# Patient Record
Sex: FEMALE | Race: WHITE | NOT HISPANIC OR LATINO | Employment: FULL TIME
[De-identification: names, ages, dates, MRNs, and addresses within clinical notes are randomized per-mention and may not be internally consistent; named-entity substitution may affect disease eponyms.]

---

## 2017-02-14 ENCOUNTER — RECORDS - HEALTHEAST (OUTPATIENT)
Dept: ADMINISTRATIVE | Facility: OTHER | Age: 32
End: 2017-02-14

## 2017-05-26 ENCOUNTER — RECORDS - HEALTHEAST (OUTPATIENT)
Dept: ADMINISTRATIVE | Facility: OTHER | Age: 32
End: 2017-05-26

## 2017-08-14 ENCOUNTER — RECORDS - HEALTHEAST (OUTPATIENT)
Dept: ADMINISTRATIVE | Facility: OTHER | Age: 32
End: 2017-08-14

## 2017-08-14 LAB — HBA1C MFR BLD: 5.5 % (ref 4.3–5.6)

## 2018-01-22 ENCOUNTER — RECORDS - HEALTHEAST (OUTPATIENT)
Dept: ADMINISTRATIVE | Facility: OTHER | Age: 33
End: 2018-01-22

## 2018-01-30 ENCOUNTER — RECORDS - HEALTHEAST (OUTPATIENT)
Dept: ADMINISTRATIVE | Facility: OTHER | Age: 33
End: 2018-01-30

## 2018-01-30 LAB — HBA1C MFR BLD: 6.1 % (ref 4.3–5.6)

## 2018-04-03 ENCOUNTER — RECORDS - HEALTHEAST (OUTPATIENT)
Dept: ADMINISTRATIVE | Facility: OTHER | Age: 33
End: 2018-04-03

## 2019-09-03 ENCOUNTER — RECORDS - HEALTHEAST (OUTPATIENT)
Dept: ADMINISTRATIVE | Facility: OTHER | Age: 34
End: 2019-09-03

## 2019-09-03 LAB
CHOLEST SERPL-MCNC: 162 MG/DL (ref 0–199)
HBA1C MFR BLD: 5.8 % (ref 0–5.6)
HDLC SERPL-MCNC: 79 MG/DL
LDLC SERPL CALC-MCNC: 72 MG/DL
NON HDL CHOL. (LDL+VLDL): 83
TRIGLYCERIDES (HISTORICAL CONVERSION): 56 MG/DL

## 2019-10-14 ENCOUNTER — RECORDS - HEALTHEAST (OUTPATIENT)
Dept: ADMINISTRATIVE | Facility: OTHER | Age: 34
End: 2019-10-14

## 2019-11-25 ENCOUNTER — RECORDS - HEALTHEAST (OUTPATIENT)
Dept: ADMINISTRATIVE | Facility: OTHER | Age: 34
End: 2019-11-25

## 2020-01-06 ENCOUNTER — COMMUNICATION - HEALTHEAST (OUTPATIENT)
Dept: HEALTH INFORMATION MANAGEMENT | Facility: CLINIC | Age: 35
End: 2020-01-06

## 2020-01-06 ENCOUNTER — OFFICE VISIT - HEALTHEAST (OUTPATIENT)
Dept: FAMILY MEDICINE | Facility: CLINIC | Age: 35
End: 2020-01-06

## 2020-01-06 DIAGNOSIS — I10 ESSENTIAL HYPERTENSION: ICD-10-CM

## 2020-01-06 DIAGNOSIS — L71.9 ROSACEA: ICD-10-CM

## 2020-01-06 DIAGNOSIS — Z76.89 ESTABLISHING CARE WITH NEW DOCTOR, ENCOUNTER FOR: ICD-10-CM

## 2020-01-06 DIAGNOSIS — Z98.84 H/O GASTRIC BYPASS: ICD-10-CM

## 2020-01-06 DIAGNOSIS — F41.9 ANXIETY: ICD-10-CM

## 2020-01-06 DIAGNOSIS — E56.9 VITAMIN DEFICIENCY: ICD-10-CM

## 2020-01-06 DIAGNOSIS — F33.0 MILD EPISODE OF RECURRENT MAJOR DEPRESSIVE DISORDER (H): ICD-10-CM

## 2020-01-06 DIAGNOSIS — G47.00 INSOMNIA, UNSPECIFIED TYPE: ICD-10-CM

## 2020-01-06 LAB
ANION GAP SERPL CALCULATED.3IONS-SCNC: 9 MMOL/L (ref 5–18)
BUN SERPL-MCNC: 8 MG/DL (ref 8–22)
CALCIUM SERPL-MCNC: 9.5 MG/DL (ref 8.5–10.5)
CHLORIDE BLD-SCNC: 109 MMOL/L (ref 98–107)
CO2 SERPL-SCNC: 22 MMOL/L (ref 22–31)
CREAT SERPL-MCNC: 0.79 MG/DL (ref 0.6–1.1)
GFR SERPL CREATININE-BSD FRML MDRD: >60 ML/MIN/1.73M2
GLUCOSE BLD-MCNC: 98 MG/DL (ref 70–125)
POTASSIUM BLD-SCNC: 4.1 MMOL/L (ref 3.5–5)
SODIUM SERPL-SCNC: 140 MMOL/L (ref 136–145)

## 2020-01-06 RX ORDER — COPPER 313.4 MG/1
1 INTRAUTERINE DEVICE INTRAUTERINE
Status: SHIPPED | COMMUNITY
Start: 2017-08-04 | End: 2021-08-06

## 2020-01-06 ASSESSMENT — ANXIETY QUESTIONNAIRES
2. NOT BEING ABLE TO STOP OR CONTROL WORRYING: SEVERAL DAYS
6. BECOMING EASILY ANNOYED OR IRRITABLE: SEVERAL DAYS
7. FEELING AFRAID AS IF SOMETHING AWFUL MIGHT HAPPEN: MORE THAN HALF THE DAYS
GAD7 TOTAL SCORE: 8
IF YOU CHECKED OFF ANY PROBLEMS ON THIS QUESTIONNAIRE, HOW DIFFICULT HAVE THESE PROBLEMS MADE IT FOR YOU TO DO YOUR WORK, TAKE CARE OF THINGS AT HOME, OR GET ALONG WITH OTHER PEOPLE: SOMEWHAT DIFFICULT
1. FEELING NERVOUS, ANXIOUS, OR ON EDGE: SEVERAL DAYS
4. TROUBLE RELAXING: MORE THAN HALF THE DAYS
3. WORRYING TOO MUCH ABOUT DIFFERENT THINGS: SEVERAL DAYS
5. BEING SO RESTLESS THAT IT IS HARD TO SIT STILL: NOT AT ALL

## 2020-01-06 ASSESSMENT — MIFFLIN-ST. JEOR: SCORE: 1462.07

## 2020-01-06 ASSESSMENT — PATIENT HEALTH QUESTIONNAIRE - PHQ9: SUM OF ALL RESPONSES TO PHQ QUESTIONS 1-9: 4

## 2020-01-07 LAB
25(OH)D3 SERPL-MCNC: 18 NG/ML (ref 30–80)
25(OH)D3 SERPL-MCNC: 18 NG/ML (ref 30–80)

## 2020-01-24 ENCOUNTER — RECORDS - HEALTHEAST (OUTPATIENT)
Dept: HEALTH INFORMATION MANAGEMENT | Facility: CLINIC | Age: 35
End: 2020-01-24

## 2020-02-13 ENCOUNTER — COMMUNICATION - HEALTHEAST (OUTPATIENT)
Dept: FAMILY MEDICINE | Facility: CLINIC | Age: 35
End: 2020-02-13

## 2020-02-13 DIAGNOSIS — L71.9 ROSACEA: ICD-10-CM

## 2020-02-14 ENCOUNTER — COMMUNICATION - HEALTHEAST (OUTPATIENT)
Dept: FAMILY MEDICINE | Facility: CLINIC | Age: 35
End: 2020-02-14

## 2020-02-14 DIAGNOSIS — L71.9 ROSACEA: ICD-10-CM

## 2020-02-17 RX ORDER — METRONIDAZOLE 7.5 MG/G
GEL TOPICAL 2 TIMES DAILY
Qty: 45 G | Refills: 0 | Status: SHIPPED | OUTPATIENT
Start: 2020-02-17 | End: 2021-08-06

## 2020-04-23 ENCOUNTER — COMMUNICATION - HEALTHEAST (OUTPATIENT)
Dept: FAMILY MEDICINE | Facility: CLINIC | Age: 35
End: 2020-04-23

## 2020-04-30 ENCOUNTER — OFFICE VISIT - HEALTHEAST (OUTPATIENT)
Dept: FAMILY MEDICINE | Facility: CLINIC | Age: 35
End: 2020-04-30

## 2020-04-30 DIAGNOSIS — F41.9 ANXIETY: ICD-10-CM

## 2020-04-30 DIAGNOSIS — F33.0 MILD EPISODE OF RECURRENT MAJOR DEPRESSIVE DISORDER (H): ICD-10-CM

## 2020-04-30 DIAGNOSIS — G47.00 INSOMNIA, UNSPECIFIED TYPE: ICD-10-CM

## 2020-04-30 DIAGNOSIS — I10 ESSENTIAL HYPERTENSION: ICD-10-CM

## 2020-04-30 RX ORDER — PHENOL 1.4 %
AEROSOL, SPRAY (ML) MUCOUS MEMBRANE
Status: SHIPPED | COMMUNITY
Start: 2020-04-30 | End: 2021-08-06

## 2020-04-30 ASSESSMENT — PATIENT HEALTH QUESTIONNAIRE - PHQ9: SUM OF ALL RESPONSES TO PHQ QUESTIONS 1-9: 5

## 2020-04-30 ASSESSMENT — ANXIETY QUESTIONNAIRES
6. BECOMING EASILY ANNOYED OR IRRITABLE: SEVERAL DAYS
4. TROUBLE RELAXING: MORE THAN HALF THE DAYS
7. FEELING AFRAID AS IF SOMETHING AWFUL MIGHT HAPPEN: NOT AT ALL
2. NOT BEING ABLE TO STOP OR CONTROL WORRYING: MORE THAN HALF THE DAYS
3. WORRYING TOO MUCH ABOUT DIFFERENT THINGS: SEVERAL DAYS
GAD7 TOTAL SCORE: 9
1. FEELING NERVOUS, ANXIOUS, OR ON EDGE: NEARLY EVERY DAY
5. BEING SO RESTLESS THAT IT IS HARD TO SIT STILL: NOT AT ALL

## 2020-05-14 ENCOUNTER — OFFICE VISIT - HEALTHEAST (OUTPATIENT)
Dept: FAMILY MEDICINE | Facility: CLINIC | Age: 35
End: 2020-05-14

## 2020-05-14 DIAGNOSIS — G47.00 INSOMNIA, UNSPECIFIED TYPE: ICD-10-CM

## 2020-05-14 DIAGNOSIS — F41.9 ANXIETY: ICD-10-CM

## 2020-05-14 DIAGNOSIS — F33.0 MILD EPISODE OF RECURRENT MAJOR DEPRESSIVE DISORDER (H): ICD-10-CM

## 2020-05-14 RX ORDER — MIRTAZAPINE 15 MG/1
15 TABLET, FILM COATED ORAL AT BEDTIME
Qty: 15 TABLET | Refills: 0 | Status: SHIPPED | OUTPATIENT
Start: 2020-05-14 | End: 2021-08-06

## 2020-05-14 ASSESSMENT — ANXIETY QUESTIONNAIRES
2. NOT BEING ABLE TO STOP OR CONTROL WORRYING: NOT AT ALL
4. TROUBLE RELAXING: MORE THAN HALF THE DAYS
1. FEELING NERVOUS, ANXIOUS, OR ON EDGE: SEVERAL DAYS
3. WORRYING TOO MUCH ABOUT DIFFERENT THINGS: SEVERAL DAYS
5. BEING SO RESTLESS THAT IT IS HARD TO SIT STILL: NOT AT ALL
GAD7 TOTAL SCORE: 5
7. FEELING AFRAID AS IF SOMETHING AWFUL MIGHT HAPPEN: NOT AT ALL
6. BECOMING EASILY ANNOYED OR IRRITABLE: SEVERAL DAYS

## 2020-05-14 ASSESSMENT — PATIENT HEALTH QUESTIONNAIRE - PHQ9: SUM OF ALL RESPONSES TO PHQ QUESTIONS 1-9: 5

## 2020-07-30 ENCOUNTER — VIRTUAL VISIT (OUTPATIENT)
Dept: FAMILY MEDICINE | Facility: OTHER | Age: 35
End: 2020-07-30
Payer: COMMERCIAL

## 2020-07-30 PROCEDURE — 99421 OL DIG E/M SVC 5-10 MIN: CPT | Performed by: PHYSICIAN ASSISTANT

## 2020-07-31 ENCOUNTER — AMBULATORY - HEALTHEAST (OUTPATIENT)
Dept: FAMILY MEDICINE | Facility: CLINIC | Age: 35
End: 2020-07-31

## 2020-07-31 DIAGNOSIS — Z20.822 SUSPECTED COVID-19 VIRUS INFECTION: ICD-10-CM

## 2020-07-31 NOTE — PROGRESS NOTES
"Date: 2020 09:51:04  Clinician: Augusto Freeman  Clinician NPI: [id number]  Patient: Edel Winchester  Patient : 1985  Patient Address: [address] [place] [address]  Patient Phone: [phone]  Visit Protocol: URI  Patient Summary:  Edel is a 35 year old ( : 1985 ) female who initiated a Visit for COVID-19 (Coronavirus) evaluation and screening. When asked the question \"Please sign me up to receive news, health information and promotions from [employer].\", Edel responded \"No\".    Edel states her symptoms started 1-2 days ago.   Her symptoms consist of ageusia, myalgia, a sore throat, anosmia, facial pain or pressure, a cough, nasal congestion, rhinitis, malaise, and a headache. Edel also feels feverish.   Symptom details     Nasal secretions: The color of her mucus is yellow.    Cough: Edel coughs a few times an hour and her cough is not more bothersome at night. Phlegm does not come into her throat when she coughs. She does not believe her cough is caused by post-nasal drip.     Sore throat: Edel reports having moderate throat pain (4-6 on a 10 point pain scale), does not have exudate on her tonsils, and can swallow liquids. She is not sure if the lymph nodes in her neck are enlarged. A rash has not appeared on the skin since the sore throat started.     Temperature: Her current temperature is 98.7 degrees Fahrenheit.     Facial pain or pressure: The facial pain or pressure feels worse when bending over or leaning forward.     Headache: She states the headache is moderate (4-6 on a 10 point pain scale).      Edel denies having wheezing, nausea, teeth pain, diarrhea, vomiting, ear pain, and chills. She also denies having recent facial or sinus surgery in the past 60 days, taking antibiotic medication in the past month, and having a sinus infection within the past year. She is not experiencing dyspnea.   Precipitating events  Within the past week, Edel has not been " exposed to someone with strep throat. She has not recently been exposed to someone with influenza. Edel has been in close contact with the following high risk individuals: people with asthma, heart disease or diabetes and children under the age of 5.   Pertinent COVID-19 (Coronavirus) information  In the past 14 days, Edel has not worked in a congregate living setting.   She does not work or volunteer as healthcare worker or a  and does not work or volunteer in a healthcare facility.   Edel also has not lived in a congregate living setting in the past 14 days. She does not live with a healthcare worker.   Edel has not had a close contact with a laboratory-confirmed COVID-19 patient within 14 days of symptom onset.   Pertinent medical history  Edel does not get yeast infections when she takes antibiotics.   Edel needs a return to work/school note.   Weight: 170 lbs   Edel does not smoke or use smokeless tobacco.   She denies pregnancy and denies breastfeeding. She has menstruated in the past month.   Weight: 170 lbs    MEDICATIONS: hydrochlorothiazide oral, sertraline oral, ALLERGIES: NKDA  Clinician Response:  Dear Edel,   Your symptoms show that you may have coronavirus (COVID-19). This illness can cause fever, cough and trouble breathing. Many people get a mild case and get better on their own. Some people can get very sick.  What should I do?  We would like to test you for this virus.   1. Please call [phone] to schedule your visit. Explain that you were referred by [facility] to have a COVID-19 test. Be ready to [facility].  The following will serve as your written order for this COVID Test, ordered by me, for the indication of suspected COVID [Z20.828]: The test will be ordered in [facility], our electronic health record, after you are scheduled. It will show as ordered and authorized by John Garcia MD.  Order: COVID-19 (Coronavirus) PCR for SYMPTOMATIC testing from  "[facility].      2. When it's time for your COVID test:  Stay at least 6 feet away from others. (If someone will drive you to your test, stay in the backseat, as far away from the  as you can.)   Cover your mouth and nose with a mask, tissue or washcloth.  Go straight to the testing site. Don't make any stops on the way there or back.      3.Starting now: Stay home and away from others (self-isolate) until:   You've had no fever---and no medicine that reduces fever---for 3 full days (72 hours). And...   Your other symptoms have gotten better. For example, your cough or breathing has improved. And...   At least 10 days have passed since your symptoms started.       During this time, don't leave the house except for testing or medical care.   Stay in your own room, even for meals. Use your own bathroom if you can.   Stay away from others in your home. No hugging, kissing or shaking hands. No visitors.  Don't go to work, school or anywhere else.    Clean \"high touch\" surfaces often (doorknobs, counters, handles, etc.). Use a household cleaning spray or wipes. You'll find a full list of  on the EPA website: www.epa.gov/pesticide-registration/list-n-disinfectants-use-against-sars-cov-2.   Cover your mouth and nose with a mask, tissue or washcloth to avoid spreading germs.  Wash your hands and face often. Use soap and water.  Caregivers in these groups are at risk for severe illness due to COVID-19:  o People 65 years and older  o People who live in a nursing home or long-term care facility  o People with chronic disease (lung, heart, cancer, diabetes, kidney, liver, immunologic)  o People who have a weakened immune system, including those who:   Are in cancer treatment  Take medicine that weakens the immune system, such as corticosteroids  Had a bone marrow or organ transplant  Have an immune deficiency  Have poorly controlled HIV or AIDS  Are obese (body mass index of 40 or higher)  Smoke regularly   o " Caregivers should wear gloves while washing dishes, handling laundry and cleaning bedrooms and bathrooms.  o Use caution when washing and drying laundry: Don't shake dirty laundry, and use the warmest water setting that you can.  o For more tips, go to www.cdc.gov/coronavirus/2019-ncov/downloads/10Things.pdf.    4.Sign up for [facility]. We know it's scary to hear that you might have COVID-19. We want to track your symptoms to make sure you're okay over the next 2 weeks. Please look for an email from [employer]---this is a free, online program that we'll use to keep in touch. To sign up, follow the link in the email. Learn more at [email or web address]  How can I take care of myself?   Get lots of rest. Drink extra fluids (unless a doctor has told you not to).   Take Tylenol (acetaminophen) for fever or pain. If you have liver or kidney problems, ask your family doctor if it's okay to take Tylenol.   Adults can take either:    650 mg (two 325 mg pills) every 4 to 6 hours, or...   1,000 mg (two 500 mg pills) every 8 hours as needed.    Note: Don't take more than 3,000 mg in one day. Acetaminophen is found in many medicines (both prescribed and over-the-counter medicines). Read all labels to be sure you don't take too much.   For children, check the Tylenol bottle for the right dose. The dose is based on the child's age or weight.    If you have other health problems (like cancer, heart failure, an organ transplant or severe kidney disease): Call your specialty clinic if you don't feel better in the next 2 days.       Know when to call 911. Emergency warning signs include:    Trouble breathing or shortness of breath Pain or pressure in the chest that doesn't go away Feeling confused like you haven't felt before, or not being able to wake up Bluish-colored lips or face.  Where can I get more information?    [facility] -- About COVID-19: [email or web address]   CDC -- What to Do If  You're Sick: www.cdc.gov/coronavirus/2019-ncov/about/steps-when-sick.html   CDC -- Ending Home Isolation: www.cdc.gov/coronavirus/2019-ncov/hcp/disposition-in-home-patients.html   [facility] -- Caring for Someone: www.cdc.gov/coronavirus/2019-ncov/if-you-are-sick/care-for-someone.html   [facility] -- Interim Guidance for Hospital Discharge to Home: www.[facility].[place]./diseases/coronavirus/hcp/hospdischarge.pdf   [facility] clinical trials (COVID-19 research studies): clinicalaffairs.[facility]-clinical-trials    Below are the COVID-19 hotlines at the [place] Department of Health ([facility]). Interpreters are available.    For health questions: Call 152-112-2400 or 1-938.422.7124 (7 a.m. to 7 p.m.) For questions about schools and childcare: Call 501-213-6086 or 1-256.728.6597 (7 a.m. to 7 p.m.)    Diagnosis: Acute upper respiratory infection, unspecified  Diagnosis ICD: J06.9  Additional Clinician Notes: if your symptoms are not improving or worsen, please go to one of our urgent care locations for evaluation.

## 2020-08-02 ENCOUNTER — COMMUNICATION - HEALTHEAST (OUTPATIENT)
Dept: SCHEDULING | Facility: CLINIC | Age: 35
End: 2020-08-02

## 2020-08-12 ENCOUNTER — COMMUNICATION - HEALTHEAST (OUTPATIENT)
Dept: FAMILY MEDICINE | Facility: CLINIC | Age: 35
End: 2020-08-12

## 2020-08-12 DIAGNOSIS — I10 ESSENTIAL HYPERTENSION: ICD-10-CM

## 2020-08-12 DIAGNOSIS — F41.9 ANXIETY: ICD-10-CM

## 2020-08-12 DIAGNOSIS — F33.0 MILD EPISODE OF RECURRENT MAJOR DEPRESSIVE DISORDER (H): ICD-10-CM

## 2020-08-13 RX ORDER — HYDROCHLOROTHIAZIDE 12.5 MG/1
12.5 CAPSULE ORAL DAILY
Qty: 90 CAPSULE | Refills: 1 | Status: SHIPPED | OUTPATIENT
Start: 2020-08-13 | End: 2021-08-06

## 2020-08-20 ENCOUNTER — COMMUNICATION - HEALTHEAST (OUTPATIENT)
Dept: FAMILY MEDICINE | Facility: CLINIC | Age: 35
End: 2020-08-20

## 2020-12-14 ENCOUNTER — COMMUNICATION - HEALTHEAST (OUTPATIENT)
Dept: FAMILY MEDICINE | Facility: CLINIC | Age: 35
End: 2020-12-14

## 2021-01-07 ENCOUNTER — TRANSFERRED RECORDS (OUTPATIENT)
Dept: HEALTH INFORMATION MANAGEMENT | Facility: CLINIC | Age: 36
End: 2021-01-07

## 2021-01-07 LAB
ALT SERPL-CCNC: 13 U/L (ref 6–26)
AST SERPL-CCNC: 11 U/L (ref 10–30)
C TRACH DNA SPEC QL PROBE+SIG AMP: NEGATIVE
CREATININE (EXTERNAL): 0.72 MG/DL (ref 0.5–1.1)
GFR ESTIMATED (EXTERNAL): 109 ML/MIN/1.73M2
GFR ESTIMATED (IF AFRICAN AMERICAN) (EXTERNAL): 127 ML/MIN/1.73M2
HBA1C MFR BLD: 5.6 %
HIV 1&2 EXT: NORMAL
N GONORRHOEA DNA SPEC QL PROBE+SIG AMP: NEGATIVE
SPECIMEN DESCRIP: NORMAL
SPECIMEN DESCRIPTION: NORMAL
TSH SERPL-ACNC: 0.92 MIU/L (ref 0.4–4.5)

## 2021-02-03 ENCOUNTER — TRANSFERRED RECORDS (OUTPATIENT)
Dept: HEALTH INFORMATION MANAGEMENT | Facility: CLINIC | Age: 36
End: 2021-02-03

## 2021-04-20 ENCOUNTER — COMMUNICATION - HEALTHEAST (OUTPATIENT)
Dept: FAMILY MEDICINE | Facility: CLINIC | Age: 36
End: 2021-04-20

## 2021-04-20 DIAGNOSIS — F33.0 MILD EPISODE OF RECURRENT MAJOR DEPRESSIVE DISORDER (H): ICD-10-CM

## 2021-04-20 DIAGNOSIS — F41.9 ANXIETY: ICD-10-CM

## 2021-04-21 RX ORDER — SERTRALINE HYDROCHLORIDE 100 MG/1
TABLET, FILM COATED ORAL
Qty: 90 TABLET | Refills: 0 | Status: SHIPPED | OUTPATIENT
Start: 2021-04-21 | End: 2021-08-02

## 2021-05-26 ASSESSMENT — PATIENT HEALTH QUESTIONNAIRE - PHQ9: SUM OF ALL RESPONSES TO PHQ QUESTIONS 1-9: 4

## 2021-05-27 ENCOUNTER — TRANSFERRED RECORDS (OUTPATIENT)
Dept: HEALTH INFORMATION MANAGEMENT | Facility: CLINIC | Age: 36
End: 2021-05-27

## 2021-05-27 ASSESSMENT — PATIENT HEALTH QUESTIONNAIRE - PHQ9
SUM OF ALL RESPONSES TO PHQ QUESTIONS 1-9: 5
SUM OF ALL RESPONSES TO PHQ QUESTIONS 1-9: 5

## 2021-05-28 ASSESSMENT — ANXIETY QUESTIONNAIRES
GAD7 TOTAL SCORE: 9
GAD7 TOTAL SCORE: 5
GAD7 TOTAL SCORE: 8

## 2021-05-30 ENCOUNTER — RECORDS - HEALTHEAST (OUTPATIENT)
Dept: ADMINISTRATIVE | Facility: CLINIC | Age: 36
End: 2021-05-30

## 2021-06-01 ENCOUNTER — RECORDS - HEALTHEAST (OUTPATIENT)
Dept: ADMINISTRATIVE | Facility: CLINIC | Age: 36
End: 2021-06-01

## 2021-06-04 VITALS
OXYGEN SATURATION: 100 % | RESPIRATION RATE: 16 BRPM | BODY MASS INDEX: 32.81 KG/M2 | HEART RATE: 76 BPM | DIASTOLIC BLOOD PRESSURE: 62 MMHG | HEIGHT: 62 IN | TEMPERATURE: 98.2 F | SYSTOLIC BLOOD PRESSURE: 112 MMHG | WEIGHT: 178.31 LBS

## 2021-06-04 VITALS — BODY MASS INDEX: 30.18 KG/M2 | WEIGHT: 165 LBS

## 2021-06-04 NOTE — PROGRESS NOTES
Office Visit - New Patient   Edel Castro   34 y.o.  female    Date of visit: 1/6/2020  Physician: Velvet Everett CNP     Assessment and Plan   1. Anxiety  sertraline (ZOLOFT) 100 MG tablet   2. Mild episode of recurrent major depressive disorder (H)  sertraline (ZOLOFT) 100 MG tablet   3. Insomnia, unspecified type     4. Essential hypertension  hydroCHLOROthiazide (MICROZIDE) 12.5 mg capsule    Basic Metabolic Panel   5. H/O gastric bypass  Vitamin D, Total (25-Hydroxy)   6. Vitamin deficiency  Vitamin D, Total (25-Hydroxy)   7. Rosacea     8. Establishing care with new doctor, encounter for       PHQ-2 Total Score: 0 (1/6/2020  9:00 AM)  PHQ-9 Total Score: 4 (1/6/2020  9:00 AM)    MECHELLE-7: 8    She will continue on the same dose of Zoloft for now. She should continue on the same dose of melatonin for insomnia.  She will continue to see her therapist every 2 weeks and continue to attend couples counseling monthly with her  at [facility].    She will continue to work on diet and exercise/ weight loss.  Her goal is to eventually get off of her blood pressure medication.  I do not see this being an issue since she is on a low-dose at this time.  She is due for some blood work today to follow-up on her electrolyte status since starting the diuretic therapy 2 months ago.    Continue with topical MetroGel for rosacea    The following high BMI interventions were performed this visit: encouragement to exercise and weight monitoring    Return in about 6 months (around 7/6/2020) for depression, anxiety, annual physical, hypertension.     Chief Complaint   Chief Complaint   Patient presents with     [facility]     [facility] - Dr. Jackman        Patient Profile   Social History     Social History Narrative     Not on file        Past Medical History   Patient Active Problem List   Diagnosis     IUD (intrauterine device) in place     Acne     Anxiety     Cervical cancer  screening     Polycystic ovary syndrome     Severe pre-eclampsia in third trimester     Status post bariatric surgery     Surveillance for birth control, intrauterine device     Mild episode of recurrent major depressive disorder (H)     Establishing care with new doctor, encounter for     Essential hypertension     H/O gastric bypass     Vitamin deficiency     Rosacea       Past Surgical History  She has a past surgical history that includes Gastric bypass (2014).     History of Present Illness   This 34 y.o. old presents to establish care.  Medical, family and surgical history reviewed.  Patient is , she has 1 child age 2-1/2.  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  She does not drink alcohol, she is a non-smoker and she denies any illicit drug use.  She is trying to follow a low-carb diet, she is status post gastric bypass sleeve and 2014.  Her maximum weight was 240 preop and her lowest weight was 150, today she weighs 178 with a current BMI of 32.6.  Does drink over 64 ounces of water daily, she does exercise 3 days/week for 30 to 45 minutes at a time.  She does have a copper IUD in place which was inserted in 2017.  She does get her period monthly and it typically last 7 days with moderate flow.  She has no history of abortions or miscarriages, she did have one Pap smear back in 2011 that was positive for ASCUS, since that time, her Pap smears have been normal.  Her last Pap was performed in 2016.  Last physical was in November 2019.  She is up-to-date with vaccinations.    Medical history includes rosacea, hypertension, vitamin D deficiency, anxiety, depression and insomnia.    Rosacea is currently well controlled using topical MetroGel which was initiated by her previous PCP in November 2019.      Blood pressure is well controlled today on current dose of hydrochlorothiazide 12.5 mg daily.  She denies any chest pain, shortness of breath, dizziness, lower extremity  swelling or headaches since starting the medication 2 months ago.  No history of coronary artery disease.    Anxiety and depression are well controlled on her current dose of sertraline.  She has been on the medication for 6 months now.  She feels that her anxiety and depression fully peaked about that time.  She was seen a therapist at that time who recommended that she start some sort of medication due to the severity of her symptoms.  She feels the medication has been working well in controlling her symptoms.  Her main stressors revolve around her family life which is a blended family and dealing with her 's ex and her work setting.  When she was a patient at [facility], her 's ex accessed the patient's daughter's chart which is a violation of HIPAA.  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  She is hopeful that her is been's ex-wife will not be able to access any of their medical records now that she has changed facilities.  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  Her coping strategies include going to counseling, rescuing dogs and fostering dogs, taking trips to [place] and exercising.  He has no thoughts self-harm or plans for suicide, no history of suicide attempts.    Insomnia: Currently well controlled using melatonin 10 mg 1 hour before sleep.  She is able to sleep roughly 7 hours.  She started taking this medication because she was having difficulty falling asleep.       Review of Systems: A comprehensive review of systems was negative except as noted.     Medications and Allergies   Current Outpatient Medications   Medication Sig Dispense Refill     copper (PARAGARD T 380A) 380 square mm IUD IUD 1 Device by Intrauterine route.       hydroCHLOROthiazide (MICROZIDE) 12.5 mg capsule Take 1 capsule (12.5 mg total) by mouth daily. 90 capsule 1     metroNIDAZOLE (METROGEL) 0.75 % gel Apply  "topically 2 (two) times a day.       sertraline (ZOLOFT) 100 MG tablet Take 1 tablet (100 mg total) by mouth daily. 90 tablet 1     No current facility-administered medications for this visit.      No Known Allergies     Family and Social History   Family History   Problem Relation Age of Onset     Diabetes Mother         pre-diabetic     No Medical Problems Father      No Medical Problems Brother         Social History     Tobacco Use     Smoking status: Former Smoker     Smokeless tobacco: Never Used   Substance Use Topics     Alcohol use: Not Currently     Drug use: Never        Physical Exam   General Appearance:   A & O x4, NAD, pleasant, well groomed  /62   Pulse 76   Temp 98.2  F (36.8  C)   Resp 16   Ht 5' 2\" (1.575 m)   Wt 178 lb 5 oz (80.9 kg)   LMP 01/02/2020 (Exact Date)   SpO2 100%   Breastfeeding No   BMI 32.61 kg/m        RESPIRATORY: Breathing pattern was normal and the chest moved symmetrically.    Lung sounds were normal and there were no abnormal sounds.  CARDIOVASCULAR: Heart rate and rhythm were normal.  S1 and S2 were normal and there were no extra sounds or murmurs.  There was no peripheral edema.  SKIN/HAIR/NAILS: Skin color was normal.  There were no skin lesions.  Hair and nails were normal.  PSYCHIATRIC:  Mood and affect were normal and the patient had normal recent and remote memory. The patient's judgment and insight were normal.         Additional Information     Review and/or order of clinical lab tests:  Review and/or order of radiology tests:  Review and/or order of medicine tests:  Discussion of test results with performing physician:  Decision to obtain old records and/or obtain history from someone other than the patient:  Review and summarization of old records and/or obtaining history from someone other than the patient and.or discussion of case with another health care provider:  Independent visualization of image, tracing or specimen itself:    Time: total time " spent with the patient was 45 minutes of which >50% was spent in counseling and coordination of care we reviewed her medical, family and surgical history along with her plan of care for hypertension, anxiety, depression and insomnia.  We discussed her plan of care for her rosacea as well.  Med list reviewed along with immunization history.     Velvet Everett, CNP  Family Nurse Practitioner  [facility]  [phone]

## 2021-06-06 NOTE — TELEPHONE ENCOUNTER
RN cannot approve Refill Request    RN can NOT refill this medication med is not covered by policy/route to provider. Last office visit: Visit date not found Last Physical: Visit date not found Last MTM visit: Visit date not found Last visit same specialty: 1/6/2020 Velvet Everett NP.  Next visit within 3 mo: Visit date not found  Next physical within 3 mo: Visit date not found      Susan Espitia, [facility] Triage/Med Refill 2/15/2020    Requested Prescriptions   Pending Prescriptions Disp Refills     metroNIDAZOLE (METROGEL) 0.75 % gel 45 g 0     Sig: Apply topically 2 (two) times a day.       Topical Dermatology Medications Refill Protocol Passed - 2/14/2020 10:24 PM        Passed - Patient has had office visit/physical in last 1 year     Last office visit with prescriber/PCP: Visit date not found OR same dept: 1/6/2020 Velvet Everett NP OR same specialty: 1/6/2020 Velvet Everett NP  Last physical: Visit date not found Last MTM visit: Visit date not found   Next visit within 3 mo: Visit date not found  Next physical within 3 mo: Visit date not found  Prescriber OR PCP: Alex Dalton MD  Last diagnosis associated with med order: 1. Rosacea  - metroNIDAZOLE (METROGEL) 0.75 % gel; Apply topically 2 (two) times a day.  Dispense: 45 g; Refill: 0    If protocol passes may refill for 12 months if within 3 months of last provider visit (or a total of 15 months).

## 2021-06-07 NOTE — PROGRESS NOTES
"Edel Castro is a 34 y.o. female who is being evaluated via a billable telephone visit.      The patient has been notified of following:     \"This telephone visit will be conducted via a call between you and your physician/provider. We have found that certain health care needs can be provided without the need for a physical exam.  This service lets us provide the care you need with a short phone conversation.  If a prescription is necessary we can send it directly to your pharmacy.  If lab work is needed we can place an order for that and you can then stop by our lab to have the test done at a later time.    Telephone visits are billed at different rates depending on your insurance coverage. During this emergency period, for some insurers they may be billed the same as an in-person visit.  Please reach out to your insurance provider with any questions.    If during the course of the call the physician/provider feels a telephone visit is not appropriate, you will not be charged for this service.\"    Patient has given verbal consent to a Telephone visit? Yes    Patient would like to receive their AVS by AVS Preference: E-Mail (Inform patient AVS not encrypted with this option).    Additional provider notes: Calling to discuss her insomnia, anxiety, depression and blood pressure.    Anxiety/depression: well controlled on current Zoloft dose of 100 mg daily. She continues to see her therapist every other week. Current stressors: 's ex wife, working from home with kids,  being an essential employee so she worries about COVID exposure because of his job and the health of their children. Coping strategies: seeing her therapist, house projects, walking dog. Denies suicidal thoughts or self harm. She is not having any panic attacks.     Insomnia: having a difficult time falling asleep. She crawls in bed around 10 pm. She does not fall asleep until 1 or 3 am. She is taking Melatonin 10 mg at HS. She tried " Trazodone in the past but she felt too groggy on the medication and she worries about not being able to hear her kids throughout the night if she takes anything too strong. She has tried leaving the TV off and reading but this has not been helpful. She drinks one can of soda daily so caffeine intake is not excessive. She does not exercise. Diet could be better. She discussed her frustrations regarding her insomnia with her therapist who recommended that she speak with me about medication.     Hypertension: she does not check her BP at home. Denies radiation, diaphoresis, shortness of breath, dizziness, syncope, nausea, palpitations, and associated with activity.   She is currently taking HCTZ 12.5 mg daily. No history of CAD. Former smoker.     1. Essential hypertension     2. Anxiety  PHQ9 Depression Screen    hydrOXYzine pamoate (VISTARIL) 25 MG capsule   3. Mild episode of recurrent major depressive disorder (H)  PHQ9 Depression Screen   4. Insomnia, unspecified type  hydrOXYzine pamoate (VISTARIL) 25 MG capsule     PHQ-9: 5  MECHELLE-7: 9    Assessment/Plan:  1. Essential hypertension    -well controlled on current HCTZ dose  -no med changes made  -due for lab work at next visit    2. Anxiety    - PHQ9 Depression Screen  - hydrOXYzine pamoate (VISTARIL) 25 MG capsule; Take 1 capsule (25 mg total) by mouth 3 (three) times a day as needed for anxiety (insomnia).  Dispense: 30 capsule; Refill: 1  -follow up with me in 4 weeks  -continue to see therapist every other week  -watch caffeine intake, discussed importance of regular exercise, healthy diet    3. Mild episode of recurrent major depressive disorder (H)    - PHQ9 Depression Screen  -follow up with me in 4 weeks  -continue to see therapist every other week  -watch caffeine intake, discussed importance of regular exercise, healthy diet        4. Insomnia, unspecified type    - hydrOXYzine pamoate (VISTARIL) 25 MG capsule; Take 1 capsule (25 mg total) by mouth 3  (three) times a day as needed for anxiety (insomnia).  Dispense: 30 capsule; Refill: 1  -follow up with me in 4 weeks  -continue to see therapist every other week  -watch caffeine intake, discussed importance of regular exercise, healthy diet            Phone call duration:  13 minutes    Velvet Everett NP

## 2021-06-08 NOTE — PROGRESS NOTES
"Edel Castro is a 34 y.o. female who is being evaluated via a billable telephone visit.      The patient has been notified of following:     \"This telephone visit will be conducted via a call between you and your physician/provider. We have found that certain health care needs can be provided without the need for a physical exam.  This service lets us provide the care you need with a short phone conversation.  If a prescription is necessary we can send it directly to your pharmacy.  If lab work is needed we can place an order for that and you can then stop by our lab to have the test done at a later time.    Telephone visits are billed at different rates depending on your insurance coverage. During this emergency period, for some insurers they may be billed the same as an in-person visit.  Please reach out to your insurance provider with any questions.    If during the course of the call the physician/provider feels a telephone visit is not appropriate, you will not be charged for this service.\"    Patient has given verbal consent to a Telephone visit? Yes    What phone number would you like to be contacted at? [phone]     Patient would like to receive their AVS by AVS Preference: [place].    Additional provider notes: Calling to discuss her insomnia    Her anxiety and depression are stable on current dose of Zoloft, she has no concerns with this medication. She continues to have difficulty falling asleep at night. On average, it takes her about 3 to 5 hours to fall asleep. Once she is asleep, she is able to sleep through the night. This has been going on for a while now and she has tried Vistaril, Trazodone and Melatonin, all of which were ineffective. She does not exercise and she denies drinking excessive amounts of caffeine. She does watch TV and scrolls on her phone but nothing that she admits would be over the top. She does have smaller children in the home so she worries about being on a medication " "that will \"knock her out\". She still needs to be able to wake up during the night in case her kids need anything.     1. Anxiety  PHQ9 Depression Screen   2. Mild episode of recurrent major depressive disorder (H)  PHQ9 Depression Screen   3. Insomnia, unspecified type  mirtazapine (REMERON) 15 MG tablet     PHQ-9: 5  MECHELLE-7: 5    Assessment/Plan:  1. Anxiety    -stable with current Zoloft dose  - PHQ9 Depression Screen    2. Mild episode of recurrent major depressive disorder (H)    -stable with current Zoloft dose  - PHQ9 Depression Screen    3. Insomnia, unspecified type    - mirtazapine (REMERON) 15 MG tablet; Take 1 tablet (15 mg total) by mouth at bedtime.  Dispense: 15 tablet; Refill: 0  -Melatonin and Vistaril were both ineffective and Trazodone left her feeling groggy  -If she like how the Remeron is working, then she should just request her next refill  -If she finds that the Remeron is ineffective, she will call and schedule another appointment to discuss         Phone call duration:  8 minutes    Velvet Everett NP    "

## 2021-06-10 NOTE — TELEPHONE ENCOUNTER
Refill Approved    Rx renewed per Medication Renewal Policy. Medication was last renewed on 1/6/20.    Melisa Horton, [facility] Connection Triage/Med Refill 8/13/2020     Requested Prescriptions   Pending Prescriptions Disp Refills     sertraline (ZOLOFT) 100 MG tablet 90 tablet 1     Sig: Take 1 tablet (100 mg total) by mouth daily.       SSRI Refill Protocol  Passed - 8/12/2020  5:14 PM        Passed - PCP or prescribing provider visit in last year     Last office visit with prescriber/PCP: 1/6/2020 Velvet Everett NP OR same dept: 1/6/2020 Velvet Everett NP OR same specialty: 1/6/2020 Velvet Everett NP  Last physical: Visit date not found Last MTM visit: Visit date not found   Next visit within 3 mo: Visit date not found  Next physical within 3 mo: Visit date not found  Prescriber OR PCP: Velvet Everett NP  Last diagnosis associated with med order: 1. Anxiety  - sertraline (ZOLOFT) 100 MG tablet; Take 1 tablet (100 mg total) by mouth daily.  Dispense: 90 tablet; Refill: 1    2. Mild episode of recurrent major depressive disorder (H)  - sertraline (ZOLOFT) 100 MG tablet; Take 1 tablet (100 mg total) by mouth daily.  Dispense: 90 tablet; Refill: 1    3. Essential hypertension  - hydroCHLOROthiazide (MICROZIDE) 12.5 mg capsule; Take 1 capsule (12.5 mg total) by mouth daily.  Dispense: 90 capsule; Refill: 1    If protocol passes may refill for 12 months if within 3 months of last provider visit (or a total of 15 months).                hydroCHLOROthiazide (MICROZIDE) 12.5 mg capsule 90 capsule 1     Sig: Take 1 capsule (12.5 mg total) by mouth daily.       Diuretics/Combination Diuretics Refill Protocol  Passed - 8/12/2020  5:14 PM        Passed - Visit with PCP or prescribing provider visit in past 12 months     Last office visit with prescriber/PCP: 1/6/2020 Velvet Everett NP OR same dept: 1/6/2020 Velvet Everett NP OR same specialty: 1/6/2020 Velvet Everett NP  Last physical:  Visit date not found Last MTM visit: Visit date not found   Next visit within 3 mo: Visit date not found  Next physical within 3 mo: Visit date not found  Prescriber OR PCP: Velvet Everett NP  Last diagnosis associated with med order: 1. Anxiety  - sertraline (ZOLOFT) 100 MG tablet; Take 1 tablet (100 mg total) by mouth daily.  Dispense: 90 tablet; Refill: 1    2. Mild episode of recurrent major depressive disorder (H)  - sertraline (ZOLOFT) 100 MG tablet; Take 1 tablet (100 mg total) by mouth daily.  Dispense: 90 tablet; Refill: 1    3. Essential hypertension  - hydroCHLOROthiazide (MICROZIDE) 12.5 mg capsule; Take 1 capsule (12.5 mg total) by mouth daily.  Dispense: 90 capsule; Refill: 1    If protocol passes may refill for 12 months if within 3 months of last provider visit (or a total of 15 months).             Passed - Serum Potassium in past 12 months      Lab Results   Component Value Date    Potassium 4.1 01/06/2020             Passed - Serum Sodium in past 12 months      Lab Results   Component Value Date    Sodium 140 01/06/2020             Passed - Blood pressure on file in past 12 months     BP Readings from Last 1 Encounters:   01/06/20 112/62             Passed - Serum Creatinine in past 12 months      Creatinine   Date Value Ref Range Status   01/06/2020 0.79 0.60 - 1.10 mg/dL Final

## 2021-06-10 NOTE — TELEPHONE ENCOUNTER
Last Med Check: 5/14/20.    Next med check due on: 11/2020.    Future Appointment Scheduled ? No.     Last Med Refill? Both on 1/6/20.    Kasandra East, CMA

## 2021-06-16 PROBLEM — I10 ESSENTIAL HYPERTENSION: Status: ACTIVE | Noted: 2020-01-06

## 2021-06-16 PROBLEM — G47.00 INSOMNIA, UNSPECIFIED TYPE: Status: ACTIVE | Noted: 2020-05-14

## 2021-06-16 PROBLEM — E56.9 VITAMIN DEFICIENCY: Status: ACTIVE | Noted: 2020-01-06

## 2021-06-16 PROBLEM — O14.13 SEVERE PRE-ECLAMPSIA IN THIRD TRIMESTER: Status: ACTIVE | Noted: 2017-05-25

## 2021-06-16 PROBLEM — Z30.431 SURVEILLANCE FOR BIRTH CONTROL, INTRAUTERINE DEVICE: Status: ACTIVE | Noted: 2017-08-04

## 2021-06-16 PROBLEM — L70.9 ACNE: Status: ACTIVE | Noted: 2020-01-06

## 2021-06-16 PROBLEM — Z98.84 H/O GASTRIC BYPASS: Status: ACTIVE | Noted: 2020-01-06

## 2021-06-16 PROBLEM — F41.9 ANXIETY: Status: ACTIVE | Noted: 2020-01-06

## 2021-06-16 PROBLEM — E28.2 POLYCYSTIC OVARY SYNDROME: Status: ACTIVE | Noted: 2020-01-06

## 2021-06-16 PROBLEM — F33.0 MILD EPISODE OF RECURRENT MAJOR DEPRESSIVE DISORDER (H): Status: ACTIVE | Noted: 2020-01-06

## 2021-06-16 PROBLEM — L71.9 ROSACEA: Status: ACTIVE | Noted: 2020-01-06

## 2021-06-16 NOTE — TELEPHONE ENCOUNTER
Refill Approved    Rx renewed per Medication Renewal Policy. Medication was last renewed on 8/13/20.    Jay Ellis, [facility] Connection Triage/Med Refill 4/21/2021     Requested Prescriptions   Pending Prescriptions Disp Refills     sertraline (ZOLOFT) 100 MG tablet [Pharmacy Med Name: SERTRALINE 100MG TABLETS] 90 tablet 1     Sig: TAKE 1 TABLET(100 MG) BY MOUTH DAILY       SSRI Refill Protocol  Passed - 4/20/2021  5:52 PM        Passed - PCP or prescribing provider visit in last year     Last office visit with prescriber/PCP: 1/6/2020 Velvet Everett NP OR same dept: Visit date not found OR same specialty: 1/6/2020 Velvet Everett NP  Last physical: Visit date not found Last MTM visit: Visit date not found   Next visit within 3 mo: Visit date not found  Next physical within 3 mo: Visit date not found  Prescriber OR PCP: Velvet Everett NP  Last diagnosis associated with med order: 1. Anxiety  - sertraline (ZOLOFT) 100 MG tablet [Pharmacy Med Name: SERTRALINE 100MG TABLETS]; TAKE 1 TABLET(100 MG) BY MOUTH DAILY  Dispense: 90 tablet; Refill: 1    2. Mild episode of recurrent major depressive disorder (H)  - sertraline (ZOLOFT) 100 MG tablet [Pharmacy Med Name: SERTRALINE 100MG TABLETS]; TAKE 1 TABLET(100 MG) BY MOUTH DAILY  Dispense: 90 tablet; Refill: 1    If protocol passes may refill for 12 months if within 3 months of last provider visit (or a total of 15 months).

## 2021-06-20 ENCOUNTER — HEALTH MAINTENANCE LETTER (OUTPATIENT)
Age: 36
End: 2021-06-20

## 2021-06-20 NOTE — LETTER
Letter by Luz Marina Becker at      Author: Luz Marina Becker Service: -- Author Type: --    Filed:  Encounter Date: 1/6/2020 Status: Signed         January 6, 2020       Edel Castro  [address] [address]    Dear Edel Castro:    We are pleased to provide you with secure, online access to medical information for you and your family within . Per your request, we have expanded your account to allow access to the records of the following family members:              Daria THAKUR Matthew (privilege ends on 5/31/2029.)     How Do I Log In?  1. In your Internet browser, go to [email or web address]  2. Log into [employer] using your [employer] Username and Password.  3. Click Sign In.        How Do I Access a Family Member's Account?  4. Select the account you want to access by clicking the Hoh with the appropriate patient's name at the top of your screen.   5. You will see a disclaimer page letting you know that you will be viewing a family member's record. Review the disclaimer and then click Accept Proxy Access Disclaimer to proceed.  6. Once you switch to viewing a family member's record, you can navigate to [employer] pages the same way you would for yourself. You can return to your own account by clicking the Hoh at the top of the screen with your name on it.    7. To customize the colors and names of the linked accounts, you can select Personalize from the Profile dropdown menu at the top of the screen, then click the Edit button to make changes.     Additional Information  If you have questions, visit [email or web address], e-mail [email or web address] or call [phone] to talk to our [employer] staff. Remember, [employer] is NOT to be used for urgent needs. For medical emergencies, dial 911.

## 2021-06-20 NOTE — LETTER
Letter by Velvet Everett NP at      Author: Velvet Everett NP Service: -- Author Type: --    Filed:  Encounter Date: 1/6/2020 Status: Signed                    My Depression Action Plan  Name: Edel Castro   Date of Birth 1985  Date: 1/6/2020    My Doctor: Provider, No Primary Care   My Clinic: [facility]  [address]  [place] [address]  [facility] [address]  [phone]          GREEN    ZONE   Good Control    What it looks like:     Things are going generally well. You have normal ups and downs. You may even feel depressed from time to time, but bad moods usually last less than a day.   What you need to do:  1. Continue to care for yourself (see self care plan)  2. Check your depression survival kit and update it as needed  3. Follow your physicians recommendations including any medication.  4. Do not stop taking medication unless you consult with your physician first.           YELLOW         ZONE Getting Worse    What it looks like:     Depression is starting to interfere with your life.     It may be hard to get out of bed; you may be starting to isolate yourself from others.    Symptoms of depression are starting to last most all day and this has happened for several days.     You may have suicidal thoughts but they are not constant.   What you need to do:     1. Call your care team, your response to treatment will improve if you keep your care team informed of your progress. Yellow periods are signs an adjustment may need to be made.     2. Continue your self-care, even if you have to fake it!    3. Talk to someone in your support network    4. Open up your depression survival kit           RED    ZONE Medical Alert - Get Help    What it looks like:     Depression is seriously interfering with your life.     You may experience these or other symptoms: You cant get out of bed most days, cant work or engage in other necessary  activities, you have trouble taking care of basic hygiene, or basic responsibilities, thoughts of suicide or death that will not go away, self-injurious behavior.     What you need to do:  1. Call your care team and request a same-day appointment. If they are not available (weekends or after hours) call your local crisis line, emergency room or 911.            Depression Self Care Plan / Survival Kit    Self-Care for Depression  Heres the deal. Your body and mind are really not as separate as most people think.  What you do and think affects how you feel and how you feel influences what you do and think. This means if you do things that people who feel good do, it will help you feel better.  Sometimes this is all it takes.  There is also a place for medication and therapy depending on how severe your depression is, so be sure to consult with your medical provider and/ or Behavioral Health Consultant if your symptoms are worsening or not improving.     In order to better manage my stress, I will:    Exercise  Get some form of exercise, every day. This will help reduce pain and release endorphins, the feel good chemicals in your brain. This is almost as good as taking antidepressants!  This is not the same as joining a gym and then never going! (they count on that by the way?) It can be as simple as just going for a walk or doing some gardening, anything that will get you moving.      Hygiene   Maintain good hygiene (Get out of bed in the morning, Make your bed, Brush your teeth, Take a shower, and Get dressed like you were going to work, even if you are unemployed).  If your clothes don't fit try to get ones that do.    Diet  I will strive to eat foods that are good for me, drink plenty of water, and avoid excessive sugar, caffeine, alcohol, and other mood-altering substances.  Some foods that are helpful in depression are: complex carbohydrates, B vitamins, flaxseed, fish or fish oil, fresh fruits and  vegetables.    Psychotherapy  I agree to participate in Individual Therapy (if recommended).    Medication  If prescribed medications, I agree to take them.  Missing doses can result in serious side effects.  I understand that drinking alcohol, or other illicit drug use, may cause potential side effects.  I will not stop my medication abruptly without first discussing it with my provider.    Staying Connected With Others  I will stay in touch with my friends, family members, and my primary care provider/team.    Use your imagination  Be creative.  We all have a creative side; it doesnt matter if its oil painting, sand castles, or mud pies! This will also kick up the endorphins.    Witness Beauty  (AKA stop and smell the roses) Take a look outside, even in mid-winter. Notice colors, textures. Watch the squirrels and birds.     Service to others  Be of service to others.  There is always someone else in need.  By helping others we can get out of ourselves and remember the really important things.  This also provides opportunities for practicing all the other parts of the program.    Humor  Laugh and be silly!  Adjust your TV habits for less news and crime-drama and more comedy.    Control your stress  Try breathing deep, massage therapy, biofeedback, and meditation. Find time to relax each day.     My support system    Clinic Contact:  Phone number:    Contact 1:  Phone number:    Contact 2:  Phone number:    Jewish/:  Phone number:    Therapist:  Phone number:    Local crisis center:    Phone number:    Other community support:  Phone number:

## 2021-07-06 ENCOUNTER — HOSPITAL ENCOUNTER (OUTPATIENT)
Facility: CLINIC | Age: 36
End: 2021-07-06
Payer: COMMERCIAL

## 2021-07-21 ENCOUNTER — TRANSFERRED RECORDS (OUTPATIENT)
Dept: HEALTH INFORMATION MANAGEMENT | Facility: CLINIC | Age: 36
End: 2021-07-21

## 2021-08-05 DIAGNOSIS — Z33.1 PREGNANT STATE, INCIDENTAL: Primary | ICD-10-CM

## 2021-08-06 ENCOUNTER — VIRTUAL VISIT (OUTPATIENT)
Dept: FAMILY MEDICINE | Facility: CLINIC | Age: 36
End: 2021-08-06
Payer: COMMERCIAL

## 2021-08-06 DIAGNOSIS — F41.9 ANXIETY: Primary | ICD-10-CM

## 2021-08-06 DIAGNOSIS — F33.0 MILD EPISODE OF RECURRENT MAJOR DEPRESSIVE DISORDER (H): ICD-10-CM

## 2021-08-06 DIAGNOSIS — G47.00 INSOMNIA, UNSPECIFIED TYPE: ICD-10-CM

## 2021-08-06 PROCEDURE — 99213 OFFICE O/P EST LOW 20 MIN: CPT | Mod: 95 | Performed by: NURSE PRACTITIONER

## 2021-08-06 PROCEDURE — 96127 BRIEF EMOTIONAL/BEHAV ASSMT: CPT | Mod: 95 | Performed by: NURSE PRACTITIONER

## 2021-08-06 RX ORDER — L. ACIDOPHILUS/BIFIDO. LONGUM 15 MG
CAPSULE,DELAYED RELEASE (ENTERIC COATED) ORAL
COMMUNITY
Start: 2021-04-21 | End: 2022-03-07

## 2021-08-06 RX ORDER — SERTRALINE HYDROCHLORIDE 100 MG/1
TABLET, FILM COATED ORAL
Qty: 90 TABLET | Refills: 1 | Status: SHIPPED | OUTPATIENT
Start: 2021-08-06 | End: 2021-09-03

## 2021-08-06 RX ORDER — LANCETS 23 GAUGE
EACH MISCELLANEOUS
COMMUNITY
Start: 2021-04-21 | End: 2022-03-07

## 2021-08-06 RX ORDER — METRONIDAZOLE 7.5 MG/G
GEL TOPICAL
COMMUNITY
Start: 2020-02-17 | End: 2021-08-06

## 2021-08-06 RX ORDER — ONDANSETRON 8 MG/1
TABLET, FILM COATED ORAL EVERY 8 HOURS PRN
COMMUNITY
End: 2021-09-30

## 2021-08-06 RX ORDER — BLOOD-GLUCOSE METER
KIT MISCELLANEOUS
COMMUNITY
Start: 2021-04-21 | End: 2022-03-07

## 2021-08-06 ASSESSMENT — PATIENT HEALTH QUESTIONNAIRE - PHQ9
5. POOR APPETITE OR OVEREATING: NOT AT ALL
SUM OF ALL RESPONSES TO PHQ QUESTIONS 1-9: 3

## 2021-08-06 ASSESSMENT — ANXIETY QUESTIONNAIRES
7. FEELING AFRAID AS IF SOMETHING AWFUL MIGHT HAPPEN: NOT AT ALL
6. BECOMING EASILY ANNOYED OR IRRITABLE: SEVERAL DAYS
5. BEING SO RESTLESS THAT IT IS HARD TO SIT STILL: NOT AT ALL
GAD7 TOTAL SCORE: 2
2. NOT BEING ABLE TO STOP OR CONTROL WORRYING: NOT AT ALL
1. FEELING NERVOUS, ANXIOUS, OR ON EDGE: SEVERAL DAYS
IF YOU CHECKED OFF ANY PROBLEMS ON THIS QUESTIONNAIRE, HOW DIFFICULT HAVE THESE PROBLEMS MADE IT FOR YOU TO DO YOUR WORK, TAKE CARE OF THINGS AT HOME, OR GET ALONG WITH OTHER PEOPLE: NOT DIFFICULT AT ALL
3. WORRYING TOO MUCH ABOUT DIFFERENT THINGS: NOT AT ALL

## 2021-08-06 NOTE — PROGRESS NOTES
Edel is a 36 year old who is being evaluated via a billable video visit.      How would you like to obtain your AVS? MyChart  If the video visit is dropped, the invitation should be resent by: Text to cell phone: [phone]  Will anyone else be joining your video visit? No      Video Start Time: 1403    Assessment & Plan     Anxiety    - sertraline (ZOLOFT) 100 MG tablet  Dispense: 90 tablet; Refill: 1  Mood stable, no dose adjustment made  PHQ-9: 3  MECHELLE-7: 2  She continue to see her therapist monthly, she continues to practice mindfulness and meditation for coping strategies.     Mild episode of recurrent major depressive disorder (H)    - sertraline (ZOLOFT) 100 MG tablet  Dispense: 90 tablet; Refill: 1    Insomnia, unspecified type    She is sleeping well and no longer using medication, she is 38 weeks pregnant        12 minutes spent on the date of the encounter doing chart review, patient visit and documentation        See Patient Instructions    No follow-ups on file.    Velvet Everett NP  [facility]   Edel is a 36 year old who presents for the following health issues: med check    HPI anxiety/depression: Mood stable. Current medication: Zoloft 100 mg daily. She notes less irritability while using the medication along with decreased anxiety and depressive symptoms. She does see a therapist monthly, they are working on meditation and mindfulness for coping strategies. She denies any suicidal ideation. She is sleeping well, she gets about 8 hours of sleep per night plus a nap during the day, she is working from home. She is 38 weeks pregnant, she is scheduled to be induced on August 19. This is her 2nd child. Her OB/GYN is Dr. Darby from [facility].        Review of Systems   Constitutional, HEENT, cardiovascular, pulmonary, gi and gu systems are negative, except as otherwise noted.      Objective    Vitals - Patient Reported  Weight (Patient Reported):  88.9 kg (196 lb)        Physical Exam   GENERAL: Healthy, alert and no distress  EYES: Eyes grossly normal to inspection.  No discharge or erythema, or obvious scleral/conjunctival abnormalities.  RESP: No audible wheeze, cough, or visible cyanosis.  No visible retractions or increased work of breathing.    SKIN: Visible skin clear. No significant rash, abnormal pigmentation or lesions.  NEURO: Cranial nerves grossly intact.  Mentation and speech appropriate for age.  PSYCH: Mentation appears normal, affect normal/bright, judgement and insight intact, normal speech and appearance well-groomed.                Video-Visit Details    Type of service:  Video Visit    Video End Time:2:12 PM    Originating Location (pt. Location): Home    Distant Location (provider location):  [facility]     Platform used for Video Visit: [employer]

## 2021-08-07 ASSESSMENT — ANXIETY QUESTIONNAIRES: GAD7 TOTAL SCORE: 2

## 2021-08-08 ENCOUNTER — LAB (OUTPATIENT)
Dept: FAMILY MEDICINE | Facility: CLINIC | Age: 36
End: 2021-08-08
Attending: OBSTETRICS & GYNECOLOGY
Payer: COMMERCIAL

## 2021-08-08 DIAGNOSIS — Z33.1 PREGNANT STATE, INCIDENTAL: ICD-10-CM

## 2021-08-08 PROCEDURE — U0003 INFECTIOUS AGENT DETECTION BY NUCLEIC ACID (DNA OR RNA); SEVERE ACUTE RESPIRATORY SYNDROME CORONAVIRUS 2 (SARS-COV-2) (CORONAVIRUS DISEASE [COVID-19]), AMPLIFIED PROBE TECHNIQUE, MAKING USE OF HIGH THROUGHPUT TECHNOLOGIES AS DESCRIBED BY CMS-2020-01-R: HCPCS

## 2021-08-08 PROCEDURE — U0005 INFEC AGEN DETEC AMPLI PROBE: HCPCS

## 2021-08-09 ENCOUNTER — TRANSFERRED RECORDS (OUTPATIENT)
Dept: HEALTH INFORMATION MANAGEMENT | Facility: CLINIC | Age: 36
End: 2021-08-09

## 2021-08-09 LAB — SARS-COV-2 RNA RESP QL NAA+PROBE: NEGATIVE

## 2021-08-12 ENCOUNTER — ANESTHESIA EVENT (OUTPATIENT)
Dept: OBGYN | Facility: CLINIC | Age: 36
End: 2021-08-12
Payer: COMMERCIAL

## 2021-08-12 ENCOUNTER — ANESTHESIA (OUTPATIENT)
Dept: OBGYN | Facility: CLINIC | Age: 36
End: 2021-08-12
Payer: COMMERCIAL

## 2021-08-12 PROBLEM — O24.419 GESTATIONAL DIABETES: Status: ACTIVE | Noted: 2021-08-12

## 2021-08-12 PROCEDURE — 250N000009 HC RX 250: Performed by: ANESTHESIOLOGY

## 2021-08-12 PROCEDURE — 370N000003 HC ANESTHESIA WARD SERVICE

## 2021-08-12 PROCEDURE — 250N000011 HC RX IP 250 OP 636: Performed by: ANESTHESIOLOGY

## 2021-08-12 RX ORDER — BUPIVACAINE HYDROCHLORIDE 2.5 MG/ML
INJECTION, SOLUTION EPIDURAL; INFILTRATION; INTRACAUDAL PRN
Status: DISCONTINUED | OUTPATIENT
Start: 2021-08-12 | End: 2021-08-12

## 2021-08-12 RX ORDER — LIDOCAINE HYDROCHLORIDE AND EPINEPHRINE 15; 5 MG/ML; UG/ML
INJECTION, SOLUTION EPIDURAL PRN
Status: DISCONTINUED | OUTPATIENT
Start: 2021-08-12 | End: 2021-08-12

## 2021-08-12 RX ADMIN — BUPIVACAINE HYDROCHLORIDE 5 ML: 2.5 INJECTION, SOLUTION EPIDURAL; INFILTRATION; INTRACAUDAL at 20:58

## 2021-08-12 RX ADMIN — BUPIVACAINE HYDROCHLORIDE 3 ML: 2.5 INJECTION, SOLUTION EPIDURAL; INFILTRATION; INTRACAUDAL at 19:17

## 2021-08-12 RX ADMIN — LIDOCAINE HYDROCHLORIDE,EPINEPHRINE BITARTRATE 5 ML: 15; .005 INJECTION, SOLUTION EPIDURAL; INFILTRATION; INTRACAUDAL; PERINEURAL at 19:14

## 2021-08-13 PROBLEM — O11.9 CHRONIC HYPERTENSION WITH SUPERIMPOSED PRE-ECLAMPSIA: Status: ACTIVE | Noted: 2021-08-13

## 2021-08-13 PROBLEM — Z97.5 IUD (INTRAUTERINE DEVICE) IN PLACE: Status: RESOLVED | Noted: 2020-01-06 | Resolved: 2021-08-13

## 2021-08-13 NOTE — ANESTHESIA POSTPROCEDURE EVALUATION
"Patient: Edel Castro    * No procedures listed *    Diagnosis:* No pre-op diagnosis entered *  Diagnosis Additional Information: No value filed.    Anesthesia Type:  No value filed.    Note:  Disposition: Inpatient   Postop Pain Control:    PONV:    Neuro/Psych: Uneventful            Sign Out: Acceptable/Baseline neuro status   Airway/Respiratory: Uneventful            Sign Out: Acceptable/Baseline resp. status   CV/Hemodynamics: Uneventful            Sign Out: Acceptable CV status; No obvious hypovolemia; No obvious fluid overload   Other NRE:    DID A NON-ROUTINE EVENT OCCUR?     Event details/Postop Comments:  BP (!) 181/91   Pulse 70   Temp 36.7  C (98  F) (Oral)   Resp 16   Ht 1.575 m (5' 2\")   Wt 91.2 kg (201 lb)   LMP 11/12/2020   SpO2 97%   Breastfeeding Unknown   BMI 36.76 kg/m    CNS normal. No post dural puncture headache. No noted or reported complications of labor epidural.           Last vitals:  Vitals Value Taken Time   BP     Temp     Pulse     Resp     SpO2         Electronically Signed By: Viviana Wray MD  August 13, 2021  9:50 AM  "

## 2021-08-13 NOTE — ANESTHESIA PREPROCEDURE EVALUATION
Anesthesia Pre-Procedure Evaluation    Patient: Edel Castro   MRN: [id number] : 1985        Preoperative Diagnosis: * No surgery found *   Procedure :      Past Medical History:   Diagnosis Date     Depressive disorder     anxiety     Diabetes (H)     gestational     Hypertension     history       Past Surgical History:   Procedure Laterality Date     GASTRIC BYPASS  2014    sleeve      No Known Allergies   Social History     Tobacco Use     Smoking status: Never Smoker     Smokeless tobacco: Never Used   Substance Use Topics     Alcohol use: Never      Wt Readings from Last 1 Encounters:   21 91.2 kg (201 lb)        Anesthesia Evaluation            ROS/MED HX  ENT/Pulmonary:       Neurologic:       Cardiovascular:       METS/Exercise Tolerance:     Hematologic:       Musculoskeletal:       GI/Hepatic:       Renal/Genitourinary:       Endo:     (+) Obesity,     Psychiatric/Substance Use:       Infectious Disease:       Malignancy:       Other:            Physical Exam    Airway             Respiratory Devices and Support         Dental           Cardiovascular             Pulmonary               Other findings: htn    OUTSIDE LABS:  CBC:   Lab Results   Component Value Date    WBC 13.4 (H) 2021    HGB 10.4 (L) 2021    HCT 33.6 (L) 2021     2021     BMP:   Lab Results   Component Value Date     2020    POTASSIUM 4.1 2020    CHLORIDE 109 (H) 2020    CO2 22 2020    BUN 8 2020    CR 0.69 2021    CR 0.79 2020    GLC 80 2021    GLC 92 2021     COAGS: No results found for: PTT, INR, FIBR  POC: No results found for: BGM, HCG, HCGS  HEPATIC:   Lab Results   Component Value Date    ALT 10 2021    AST 15 2021     OTHER:   Lab Results   Component Value Date    A1C 5.2 2021    EDWIN 9.5 2020     Patient requesting labor epidural, chart reviewed.  Discussed risks including hypotension, nerve damage,  infection, and post dural puncture headache.  Patient understands, questions answered, and agrees to proceed.  Time out instituted prior to placement. Hands washed, sterile gloves and mask worn.      Anesthesia Plan    ASA Status:  3                    Consents            Postoperative Care            Comments:                Kwaku Ford MD

## 2021-08-13 NOTE — ANESTHESIA PROCEDURE NOTES
Epidural catheter Procedure Note  Pre-Procedure   Staff -        Anesthesiologist:  Kwaku Ford MD       Performed By: anesthesiologist       Location: OB       Procedure Start/Stop Times: 8/12/2021 7:03 PM and 8/12/2021 7:20 PM       Pre-Anesthestic Checklist: patient identified, IV checked, risks and benefits discussed, informed consent, pre-op evaluation and at physician/surgeon's request  Timeout:       Correct Patient: Yes        Correct Procedure: Yes        Correct Site: Yes        Correct Position: Yes   Procedure Documentation  Procedure: epidural catheter       Diagnosis: Labor Analgesia       Patient Position: sitting       Skin prep: Chloraprep       Local skin infiltrated with 2 mL of 1% lidocaine.        Insertion Site: L2-3. (midline approach).       Technique: LORT saline        Needle Type: Touhy needle       Needle Gauge: 18.        Needle Length (Inches): 3.5        Catheter: 20 G.         Catheter threaded easily.         # of attempts: 1 and  # of redirects:  0    Assessment/Narrative         Paresthesias: No.     Test dose of 3 mL at.         Test dose negative, 3 minutes after injection, for signs of intravascular, subdural, or intrathecal injection.       Insertion/Infusion Method: LORT saline       No aspiration negative for Heme or CSF via Epidural Catheter.    Comments:  Negative CSF or heme

## 2021-09-03 DIAGNOSIS — F33.0 MILD EPISODE OF RECURRENT MAJOR DEPRESSIVE DISORDER (H): ICD-10-CM

## 2021-09-03 DIAGNOSIS — F41.9 ANXIETY: ICD-10-CM

## 2021-09-03 RX ORDER — SERTRALINE HYDROCHLORIDE 100 MG/1
TABLET, FILM COATED ORAL
Qty: 90 TABLET | Refills: 1 | Status: SHIPPED | OUTPATIENT
Start: 2021-09-03 | End: 2022-02-17 | Stop reason: DRUGHIGH

## 2021-09-24 ENCOUNTER — DOCUMENTATION ONLY (OUTPATIENT)
Dept: PEDIATRICS | Facility: CLINIC | Age: 36
End: 2021-09-24

## 2021-09-24 NOTE — PROGRESS NOTES
"[facility] Pediatrics Lactation Visit     Assessment:      difficulty in feeding at breast     Carole has had somewhat slow growth over the past 3 weeks with improvement in the past 4 days. I believe her previous slower growth may have been due to a combination of finding her curve (she is at the 9th percentile for length and 16th for weight today) plus long sleep stretches at night. Mom reports that she had been primarily bottle fed pumped breast milk and seemed content after feedings. Mom, Edel, has a low milk supply - she does have some risk factors for this including pre-eclampsia and gestational diabetes + history of low milk supply with previous child. Edel plans to work with her primary doctor with a possible plan to start metformin soon due to a recent abnormal glucose tolerance test. Discussed that I would not recommend goat's rue or fenugreek since she has a history of hypoglycemia.      Carole was able to latch well to the breast today and transferred 0.4 oz total - this is less than what she needs for good growth, but consistent with volumes mom is typically able to pump. We discussed a plan to make routines of nursing/ pumping/ supplementing more sustainable. Also discussed stopping tracking how much aCrole eats/ weighs/ how much Edel is able to pump as this may be contributing to anxiety which can also negatively affect milk supply.      Carole will follow up with lactation as needed.                Plan:        Patient Instructions      Continue to breastfeed on demand, as many times as makes sense to you. I think it is Ok overnight to just nurse her (supplement after if she is showing cues). During the day I would continue to nurse and then supplement with breast milk or formula as you have been. I am happy with her growth.      Offer both sides every time, and alternate which breast you start on. Latch baby deeply by making a \"breast sandwich,\" and aim your nipple for the roof of " "the mouth. If baby's lips are rolled inward, flip the top lip out with your finger, and then apply gentle downward pressure to the chin to help the lips flange out like \"fish lips.\" If you have pain that lasts beyond the initial latch-on, always restart. When sucking/swallowing frequency starts to slow down, do breast compressions/massage and tickle baby's feet to keep her alert with feeding. A diaper change between sides can be helpful to keep her alert.     Supplementation plan: Continue to supplement as she cues. See chart below for typical intake by age.       Recommended to pump: Aim for good stimulation by nursing or pumping about 8 times per day for a maximum milk supply. Balance this with other priorities like resting, eating, having time for yourself or other kids.     Continue to monitor output, expect at least 6 wet diapers per day.      Return in about 2 weeks (around 10/8/2021) for 2 month well check .     Chioma Pa CNM is a nurse midwife who also does lactation consulting     Average Infant Milk Intake by Age     Age Average milk volume per feeding (mL) Average milk volume per feeding (oz) Average 24 hour milk intake (mL) Average 24 hour milk intake (oz)   Day 1 Few drops - 5mL < tsp Up to 30 mL Up to 1 oz   Day 2 5 - 15 mL <0.5 oz - 1 TB 30 - 120 mL 1 - 4 oz   Day 3 15 - 30 mL  0.5 - 1 oz 120 - 240 mL 4 - 8 oz   Day 4 30 - 45 mL  1 - 1.5 oz 240 - 360 mL 8 - 12 oz   Day 5-7 45 - 60 mL 1.5 - 2 oz 360 - 600 mL 12 - 18 oz   Week 2-3 60 - 90 mL 2 - 3 oz 450 - 750 mL 15 - 25 oz   Months 1-6 90 - 150 mL 3 - 5 oz 750 - 1035 mL 25 - 35 oz      Marleigh transferred 0.4 oz total today (0.2 oz per side)   -------------------------------------------------------------------------------------------           Information for breastfeeding families on Increasing breastmilk supply      Frequent stimulation of the breasts, bybreastfeeding or by using a breast pump, during the first few days and weeks, is essential to " "establish an abundant breastmilk supply. If you find your milk supply is low, try the following recommendations. If you areconsistent you will likely see an improvement within a few days. Although it may take a month or more to bring your supply up to meet your baby's needs, you will see steady, gradual improvement. You will be glad that you putthe time and effort into breastfeeding and so will your baby.      More breast stimulation    Breastfeed more often, at least 8-12 times per 24 hours.     Limitthe use of a pacifier (so that when the baby wants to suck, they are stimulating the breasts for milk production)    Try to get in \"one more feeding\" before you go to sleep, this can be done as a \"dream feed\"where you feed your baby while they sleep.    Offer both breasts at each feeding    \"Burp and switch\" using each breast twice or three times, and using different positions    \"Top up feeds\" give a short feeding in 10-20minutes if baby seems hungry    Empty your breasts well by massaging while the baby is feeding    Assure the baby is completely emptying your breasts at each feeding    Try breast massage/ compression - pushing milk tobaby during a feeding     Avoid these things that are known to reduce breastmilk supply    Smoking    Caffeine (in excess - it is ok to drink your morningcoffee!)     Birth control pills and injections    Decongestants, antihistamines like Benadryl, NyQuil or Sudafed. If you need relief for allergies, Zyrtec or Claritin are better choices that are less likely to decreasesupply.     Severe weight loss diets. A vegan or \"keto\" diet may also decrease supply due to inadequate protein or carbohydrates.     Mints, parsley, anna in excessive amounts (avoid Altoid mints or mint tea, for example)     Use a breast pump    Consider use of a hospital grade breast pump with a double kit    Pump after feedings, up to 20 minutes after you finish nursing (sothat your breasts are more full the next time " "baby nurses)    Rest 10-15 minutes prior to pumping, eat and drink something    Apply warmth to your breasts and massage before beginning to pump    Try \"power pumping\".Pumping 12 x a day for 2-3 days after a feeding, even for a short time OR Try pumping for 10min, resting for 10 min, pumping 10 min etc for an hour once or more times per day. It can help to relax and watch an hour-long TVshow while you try this.      To make pumping easier, you can rinse and refrigerate your pump parts between feedings, storing them in a Ziploc bag or Tupperware container. Wash them well at least twice per day. If your babyis premature or immunocompromised it is a better practice to wash them after each use. Most pump parts can be washed in a  on the top rack (verify with the  first).      A \"hands-free\" pumping bra canmake pumping easier. This frees your hands while you pump to do breast massage or to eat or drink while you pump.     A portable pump + \"freemie cups\" can make pumping easier to fit into your routine. Https://[employer]/         Condition your let-down reflex    Play relaxing music    Imagine your baby, look at pictures of your baby, smell baby clothing or baby powder    Watch videos of your baby    Always pumpin the same quiet, relaxed place, set up a routine    Do slow, deep, relaxed breathing, relax your shoulders     Mother care    Reduce stress and activity,get help    Increase fluid intake. Aim for 100 oz/day of fluids. Electrolytes (like in coconut water) may be helpful.     Eat nutritious meals, continue to take prenatal vitamins.     Back rubs stimulate nerves that servethe breasts (central part of the spine)    Increase skin-to-skin holding time with your baby, relax together    Take a warm, bath, read,meditate, and empty your mind of tasks that need to be done     Herbs, food and medications    Eat a bowl of cooked oatmeal daily    Jeter's yeast or ground flax seeds, 1 teaspoon one " "or more times daily (try mixing into oatmeal or baking intolactation cookies)    \"Moringa\" or \"Malunggay\" is an herb that is a \"super food\" and is well tolerated and can help increase supply. This herb is available through GoLacta supplements, Bi[employer] (use promo code PMX87fav 20% off) or other suppliers as a powder (to mix into smoothies, for example) or capsules. Herbs unfortunately are not regulated by the FDA so you have to do your own homework and choose a brand that seems reputable.     Goat's Rue is an herbal remedy intended to help increase the glandular tissue in women's breasts. This can be a powerful galactogogue (substance to increase milk supply). I would not recommend goat's rue or fenugreek for you due to a risk of hypoglycemia    Fenugreek preparations can help someincrease supply, though anecdotally others have found that it does not help their supply or even decreases supply. Because of this, I do not routinely recommend it. Use of this herb has not been formally studied. Doses of3-5 capsules (580-610 mg) three times per day are commonly recommended. Avoid fenugreek if you are diabetic, hypoglycemic, asthmatic or allergic to peanuts or other legumes or beans. Taken as directed, it may cause a faintmaple body odor. That is to be expected and means that the herb is doing it's job. To read more about fenugreek, go to http://www.breastfeeding.com/all_about/all_about_fenugreek.html    Blessed thistle or other herbs orbeverages such as Mother's Milk Tea taken as directed on the package. A reliable sources of herbs and herbal blends is Mother Love Herbals and Aide Herbs.    Lactation cookies. By searching the internet and you will findsources for packaged cookies and recipes to make your own.     Prescription medication sometimes help increase milk supply. Metaclopromide (Reglan) has been used with limited success. Domperidone has been used with moresuccess, but is not FDA approved in the [place].      Keep " "records    It is important to keep a daily log with the number of nursing + pumping sessions, amountobtained amount you are having to supplement your baby and 24 hour totals, this amount is more important that the pumped amount at each session. This will help you see your progress over the days.    Keep in touch with yourhealth care provider so he/she can monitor your progress over the days and modify advice if necessary.      Retained placenta     If you are not seeing improvement and you are having any heavybleeding, discuss the possibility of retained placental fragments with your MD or midwife. Small bits of the placenta can secrete enough hormones to prevent the milk from coming in.     Low thyroid  Have your health care provider check your thyroid levels. Low thyroid can affect milk supply. If you have been taking thyroid medication, have your levels checked after delivery, you may need your medication adjusted.      Other resources: http://www.lowmilksupply.org     [place] Hand Expression Video [email or web address]      Maximizing Milk Production Video;[email or web address]         Tips for Exclusive Pumping:      -Pump 8-12x/day to \"dial in your order\" until your milk supply regulates, usually this occurs between 6 and 12 weeks. The interval of time between pump sessions is less important than the total number of times per day that you pump.      -To create and maintain a robust milk supply, pump at least once overnight. You can drink 2 big glasses of water before you go to sleep so that your bladder will wake you up. Pump after you get up to go to the bathroom.      -Once your milk supply regulates, you can try dropping pump sessions and still maintain your milk supply. You can try dropping one pump per month and see how it goes. The following chart below can help you determine how many times per day you need to pump in order " "to maintain your milk supply, AFTER your supply has regulated.     How many times per day do I need to pump?        Smallest Capacity Small Capacity Medium Capacity Large Capacity Largest Capacity   Max pump yield (if you make =>) 1-2 oz per pump 2-3 oz per pump 3-5 oz per pump 5-9 oz per pump 10-12 oz per pump   To increase milk pump => >12 x/day 10-11 x/day 8-10x/day 6-8x/day 4-5x/day   To maintain milk pump => 8x/day 7x/day 6x/day 5x/day 3-4x/day   To decrease milk pump=> 7x/day 6x/day 4-5x/day 3x/day 2x/day   Max time between pumping 4-5 hours 6-7 hours 7-8 hours 8-10 hours 10-12 hours      Here is an article about finding the \"magic number\" regarding the number of times you need to empty your breasts every day: Http://www.[employer]/articles/tag/Magic+Number     -Signs your milk supply has regulated: Breasts feel \"soft\" or \"empty,\" breasts stop leaking between nursing or pump sessions, you stop feeling let-downs or feel them less (though some women never feel them and that is normal). This is a sign that your milk supply is switching from being hormonally driven to being driven by autocrine control (supply and demand - driven by breast emptying).      -To encourage your body to make a good amount of milk, pump until your breasts are empty. This may take several letdowns. Some women find they need to pump for 30 minutes + to fully empty their breasts.      -To cut down on dishes, you can rinse your pump parts after pumping and them in a bag or tupperware container and store them in the fridge between pumping sessions, washing them well twice per day.      -There are hands-free pumping bras available that can hold your pump parts in place. This way you can be hands-free while you pump, or you can do hands-on pumping with good massage.      -There are several portable pumps available that can allow you to move about while you pump. Baby Buddha is a good one, though there are many other options. Freemies " are a bottle and flange in one that you can wear under your shirt and pump discreetly. Freemies can either be closed system or open system; make sure you buy the correct one for the portable pump you own. Medela pumps are open system, baby buddha and spectra s9 are closed system.      [email or web address].com/  Https://freemie.com/     -You can use coconut oil to lubricate the inside of your pump flanges to decrease friction with pumping. If you are persistently sore with pumping, however, be sure that you are using the correct size flange.                           Return in about 2 weeks (around 10/8/2021) for 2 month well check .        SUBJECTIVE:      Carole is here today with mom, Edel, for lactation support. She is a 6 week old female born at Gestational Age: 39w0d now 43 days.    She is doing well. She has gained 16 oz since last visit 28 days ago. She has gained approximately 1.5 oz per day over the past 4 days and is now 19% from birth weight.         Baby is nursing 2-3 times per day for about 5 minutes per session. She nurses on both sides every time. She nurses before mom pumps.   Mother reports hearing some audible swallows.   Baby feeds about 8 times in 24 hours.   Baby is supplemented with expressed breast milk or formula, about 3 oz at a time, about the same after nursing.   Mom is also pumping about 6-9 per day and gets about 1.5-3 oz per pumping session. She gets less milk if Carole nurses first. She gets about 9 - 10 oz of pumped milk daily, the rest is formula (about 12 oz formula per day)  Number of wet diapers in 24 hours: 8  Number of stools in 24 hours: 2  Color and consistency of stools: yellow, seedy  Mom noticed her breasts grew larger and areolas darkened during pregnancy - she never noticed primary engorgement with milk coming in.         Breastfeeding Goals: Hoping to provide breast milk for a year. Hoping to increase supply and do more nursing  Previous Breastfeeding  Experience: Milk didn't come in, mom mostly pumped for about 5 months and quit after that. She had a low supply - made less than an oz each time she pumped.   Breast-surgery:  No   Maternal medications: Metformin - hasn't started yet. She is taking labetalol, will stop this shortly.   Maternal Health conditions: Mom's OB did a glucose tolerance test which was abnormal - she needs to work with primary for management. Thyroid labs were checked 2 weeks ago and were normal. No bleeding post-partum. Mom had preeclampsia, needed      Hospital course:  Hypoglycemia - resolved with glucose gels and supplementation. Discharged the next day.      No results found for any visits on 21.  No current outpatient medications on file.  Past Medical History        Past Medical History:   Diagnosis Date     Hypoglycemia 2021     Infant of diabetic mother 2021     Term  delivered vaginally, current hospitalization 2021         Past Surgical History   History reviewed. No pertinent surgical history.     Family History   No family history on file.           Primary care provider: Eduin Perez     OBJECTIVE:     Mother:   Nipples are everted, the areola is compressible, the breast is soft.      Sore nipples: At first no longer.    Maternal depression screening: Doing well  EPDS: Referral to maternal PCP not made     Infant:      Age today: 43 days     Wt 8 lb 13.4 oz (4.009 kg)   BMI 14.83 kg/m          Weight:       Wt Readings from Last 3 Encounters:   21 8 lb 13.4 oz (4.009 kg) (16 %, Z= -0.98)*   21 8 lb 7 oz (3.827 kg) (13 %, Z= -1.11)*   21 7 lb 7.5 oz (3.388 kg) (26 %, Z= -0.63)*      * Growth percentiles are based on WHO (Girls, 0-2 years) data.         Birthweight:  7 lbs 6.87 oz.   Today's weight:  8 lbs 13.4 oz This is 19% from birth weight.         Test weights:  Lactation scale pre-feeding weight: 8 lb 13.4 oz      LEFT side: 0.2 oz  RIGHT side: 0.2 oz     TOTAL  transfer:  0.4 oz         Feeding assessment:      Digital suck assessment:  Infant draws consultant's finger into mouth, palate intact, tongue over gums, normal frenulum.      Baby can hold suction with tongue while at the breast.      Alignment: Baby's head was aligned with its trunk. Baby did face mother. Baby was in cross cradle position today.      Areolar Grasp: Baby was able to open mouth wide. Baby's lips were not pursed. Baby's lips did flange outward. Tongue was visible just barely over bottom lip. Baby had complete seal.      Areolar Compression: Baby made rhythmic motion. There were no clicking or smacking sounds. There was no severe nipple discomfort.  Nipples appeared round after feeding.     Audible swallowing: Baby made quiet sounds of swallowing: There was an increase in frequency after milk ejection reflex. The milk ejection reflex is appropriate but milk supply appears low.      PHYSICAL EXAM     Gen: Alert, no acute distress.   Head: Anterior fontanelle flat and soft.   Mouth:Lips pink. Oral mucosa moist. Tongue midline (good lateralization, movement, and lift; able to extend pass lower gumline).  Palate intact. Coordinated suck.  Lungs: Clear to auscultation bilaterally.   Cardiac: Regular regular rate and rhythm, S1S2, no murmurs.  Abdomen: Soft, nontender, bowel sounds present, no hepatosplenomegaly or mass palpable. Umbilicus dry with no erythema or drainage.   : Jonathan stage 1 female genitalia  Skin: Intact, dry, appropriate coloring for ethnicity, no jaundice.   Neuro: Appropriate muscle tone.     The visit lasted a total of 70 minutes that I spent on this visit today. This time includes pre-charting, review of the chart, and face to face time with the patient.      Completed by:   ALTHEA Martins, IBCLC, [facility], Pediatrics.  9/24/2021 8:31 AM

## 2021-09-29 NOTE — PROGRESS NOTES
Assessment:   1.  Seven week old infant with history of slow weight gain, now gaining weight well on breastfeeding with formula supplementation:  1 oz/day over last 10 days  2.  Not able to nurse at breast today--restless and unwilling to latch  3.  Mother with low milk supply, recurrent in 2 breastfeeding experiences  4.  History of PCOS and metabolic syndrome:  Abdominal obesity, elevated blood sugar and elevated BP.  Desire to restart Metformin.  5.  Does not appear to have IGT;  R/o other causes low milk supply    Plan:   1.  To continue nursing and pumping plan;  Edel feels she can continue this for the time being, and is very motivated to pursue ways to increase milk supply.  2.  Reviewed that Carole needs 25-30 oz milk/day for good growth.  Could consider supplementing before breastfeeding, if that helps baby be more relaxed and breast and willing to feed.  3.  Discussed potential additional labwork to evaluate recurrent low milk supply:  Prolactin, Hgb, quant HCG, testosterone, DHEAS.  Discussed that prolactin is best assessed when drawn 20 min after breast stimulation, has wide normal range, does not always correlate with milk supply, and cannot be treated other than with methods already being used.  She would like to proceed.  4.  Discussed use of hospital-grade pump for increased stimulation, and given info on how to inquire with insurance company re: coverage.  Edel will consider.  5.  To continue with current dietary supplements--moringa, goat's rue and milk thistle--if helpful for supply.  If no results, do not need to continue with these.  6.  Agreed to start metformin 500 mg bid for one month, with advice to see PCP and/or OB/GYN for continuing management of metabolic syndrome.  7.  To see pediatric provider and PCP as planned, schedule OB/GYN visit to discuss PCOS/metabolic syndrome, and see lactation as needed.      Subjective: Edel is here today for a follow up lactation visit for low milk  "supply.  She was first seen by ARYAN Hernandez CNP, IBCLC, 6 days ago for a history of slow growth/low milk supply, and has also been followed by Wanda Luo, IBCLC, private lactation consultant.  Baby Carole transferred about 0.4 oz of milk at lactation visit last week and is requiring significant supplementation.  Edel also had this concern in her first breastfeeding experience, and is seeking further evaluation, given her known PCOS and metabolic syndrome.  She has already had some labwork done, including a 2-hr GTT for postpartum follow-up on her GDM--fasting blood sugar was 107 and 2h was 86--and A1C which was 5.2.  She does occasional blood sugar checks at home and finds her glucose \"all over the place.\"  Thyroid screening was normal postpartum.  She was anemic in the early postpartum days (Hgb 9) but has not had this rechecked.  Edel would like to restart metformin--had previously taken 500 mg bid--in hopes of improving milk supply and managing her insulin resistance.  She had been on this previous to her pregnancies (although she conceived spontaneously) and tolerated it well.  She is currently taking several supplements for milk supply, including milk thistle and moringa, and will be adding goat's rue. She has previously tried use of Reglan as a galactogogue and did not find it helpful.  She also plans to work on weight loss using a low-carb diet; has a history of a gastric sleeve procedure after which she lost 60#, but has regained about half of that with her pregnancies.    Hospital Course: Induced for GDM with cervical ripening and about 6h Pitocin.  Did require magnesium sulfate for severe postpartum pre-eclampsia.  Infant with some hypoglycemia requiring supplementation with 24 jefe formula. Given nipple shield in hospital to assist with latch.      Mother's Relevant Med/Surg History: GDM this pregnancy, history of chronic HTN, PCOS, history of gastric sleeve bariatric procedure, history of " depression/ anxiety on Zoloft    Mother's providers:  Dr. Peace Darby, OB/GYN, and PCP Velvet [place], [place].  Edel requests that today's note be forwarded to her providers--ANAYELI signed.    Breast Surgery: none    Breastfeeding Goals: to give as much breastmilk as possible    Previous Breastfeeding Experience: First child born at 36 weeks and was in NICU--had low milk supply with that child and exclusively pumped x 5 months.    Infant's name: Carole  Infant's bday: 21  Gestational age: 39 wk  Infant's birth weight: 7 # 6.9 oz    Mode of delivery: vaginal  Pediatric Provider: Dr. Perez  Discharge weight: 7 # 4.9 oz  Recent weights:  21:  7 # 3 oz  21:  7 # 7.5 oz  21:  8 # 7 oz  21:  8 # 13.4 oz    Frequency and duration of feedings: every 2 - 2 1/2 hours, 15 min/side  Swallows audible per mother: for first few minutes  Numbers of feedings in 24 hours: 8  Number urines per day: 8+  Number of stools per day and their color: 4-5, yellow seedy    Supplementation: 2 1/2 - 4 oz, with a combination of pumped milk and formula   Pumpin-3 times/day, yielding 1 - 3 oz, average 1 1/2 oz;  Using Spectra      Objective/Physical exam:   Mother: Noticed breasts grew larger and areolas darkened during pregnancy but did not notice primary engorgement when her milk came in.    Her nipples are everted, the areola is compressible, the breast is soft.  Breasts do not appear widely spaced;  Normal shaping.    Sore nipples: no  EPDS: 2    Assessment of infant: 14.8% Weight for age percentile   Age today: 7 weeks  Today's weight: 9 # 2 oz  Amount of milk transferred:  None.  Infant very restless at breast and would not nurse.    Baby has full flexion of arms and legs, normal tone, behavior is alert and active, respirations are normal, skin is normal, hydration is normal, jaw is normal size and alignment, palate is normal, frenulum is normal, baby can lateralize tongue, has adequate tongue lift, and  tongue can protrude past bottom gum line.    Suck exam:  Baby has strong, coordinated suck  with good  tongue cupping    Baby thrush: none   Jaundice: none     Feeding assessment: Baby can hold suction with tongue while at the breast, but did not sustain latch today.    Alignment: The baby was flex relaxed. Baby's head was aligned with its trunk. Baby did face mother. Baby was in cross cradle position today.     Areolar Grasp: Baby was able to open mouth wide. Baby's lips were not pursed. Baby's lips did flange outward. Tongue was visible over bottom gum. Baby had complete seal.     Areolar Compression: Baby did not make rhythmic motion at breast today. There were no clicking or smacking sounds. There was no severe nipple discomfort.     Audible swallowing: Baby made no quiet sounds of swallowing: There was no noted milk ejection reflex.  Milk supply is low.    BP (!) 150/98 (BP Location: Right arm, Patient Position: Sitting, Cuff Size: Adult Large)   LMP 2020   Breastfeeding Yes    Repeat BP at end of visit:  130/85    OB History    Para Term  AB Living   2 2 1 1 0 2   SAB TAB Ectopic Multiple Live Births   0 0 0 0 2      # Outcome Date GA Lbr Yunior/2nd Weight Sex Delivery Anes PTL Lv   2 Term 21 39w0d 04:54 / 01:23 3.37 kg (7 lb 6.9 oz) F Vag-Spont EPI N SONIYA      Complications: Fetal Intolerance      Name: WILFREDO JUDGE-LIZY      Apgar1: 6  Apgar5: 9   1                 Current Outpatient Medications:      acetaminophen (TYLENOL) 325 MG tablet, Take 2 tablets (650 mg) by mouth every 4 hours as needed for mild pain or fever (greater than or equal to 38  C /100.4  F (oral) or 38.5  C/ 101.4  F (core).), Disp: 20 tablet, Rfl: 0     blood glucose ([employer] BLOOD GLUCOSE TEST) test strip, Dispense item covered by pt ins. O99.810 Gestational DM - Test 4 times/day. New diabetes, Disp: , Rfl:      Blood Glucose Monitoring Suppl ([facility] BLOOD GLUCOSE MONITOR) w/Device KIT, Dispense meter  covered by pt insurance has Gestational DM - Test 4 times/day, Disp: , Rfl:      cholecalciferol (VITAMIN D3) 125 mcg (5000 units) capsule, Take 125 mcg by mouth daily, Disp: , Rfl:      IRON-B12-VIT C-FA-IFC PO, , Disp: , Rfl:      lancets 28G MISC, Dispense item covered by pt ins. O99.810 Gestational DM - Test 4 times/day., Disp: , Rfl:      metFORMIN (GLUCOPHAGE) 500 MG tablet, Take 1 tablet (500 mg) by mouth 2 times daily (with meals), Disp: 60 tablet, Rfl: 0     Prenatal MV-Min-Fe Fum-FA-DHA (PRENATAL 1 PO), , Disp: , Rfl:      sertraline (ZOLOFT) 100 MG tablet, [SERTRALINE (ZOLOFT) 100 MG TABLET] TAKE 1 TABLET(100 MG) BY MOUTH DAILY, Disp: 90 tablet, Rfl: 1  Past Medical History:   Diagnosis Date     Depressive disorder     anxiety     Diabetes (H)     gestational     Hypertension     history      Past Surgical History:   Procedure Laterality Date     GASTRIC BYPASS  2014    sleeve     Family History   Problem Relation Age of Onset     Diabetes Mother         pre-diabetic     No Known Problems Father      No Known Problems Brother        Time spent:  Chart review/prechartin min prior to day of service  Face-to-face visit:   68 min   Documentation:  20 min   Total time spent on day of service: 88 min    NATALY Metz, CNM, IBCLC

## 2021-09-30 ENCOUNTER — ALLIED HEALTH/NURSE VISIT (OUTPATIENT)
Dept: MIDWIFE SERVICES | Facility: CLINIC | Age: 36
End: 2021-09-30
Payer: COMMERCIAL

## 2021-09-30 VITALS — DIASTOLIC BLOOD PRESSURE: 85 MMHG | SYSTOLIC BLOOD PRESSURE: 130 MMHG

## 2021-09-30 DIAGNOSIS — O92.79 INSUFFICIENT LACTATION: Primary | ICD-10-CM

## 2021-09-30 DIAGNOSIS — E88.810 METABOLIC SYNDROME X: ICD-10-CM

## 2021-09-30 PROBLEM — O09.522 AMA (ADVANCED MATERNAL AGE) MULTIGRAVIDA 35+, SECOND TRIMESTER: Status: ACTIVE | Noted: 2021-09-30

## 2021-09-30 PROBLEM — O09.292 HX OF PREECLAMPSIA, PRIOR PREGNANCY, CURRENTLY PREGNANT, SECOND TRIMESTER: Status: ACTIVE | Noted: 2021-09-30

## 2021-09-30 PROBLEM — E66.9 OBESITY: Status: ACTIVE | Noted: 2021-09-30

## 2021-09-30 LAB
HCG SERPL-ACNC: <2 MLU/ML (ref 0–4)
HGB BLD-MCNC: 11.9 G/DL (ref 11.7–15.7)
PROLACTIN SERPL-MCNC: 103.4 NG/ML (ref 0–20)

## 2021-09-30 PROCEDURE — 84702 CHORIONIC GONADOTROPIN TEST: CPT | Performed by: ADVANCED PRACTICE MIDWIFE

## 2021-09-30 PROCEDURE — 84403 ASSAY OF TOTAL TESTOSTERONE: CPT | Performed by: ADVANCED PRACTICE MIDWIFE

## 2021-09-30 PROCEDURE — 99205 OFFICE O/P NEW HI 60 MIN: CPT | Performed by: ADVANCED PRACTICE MIDWIFE

## 2021-09-30 PROCEDURE — 82627 DEHYDROEPIANDROSTERONE: CPT | Performed by: ADVANCED PRACTICE MIDWIFE

## 2021-09-30 PROCEDURE — 36415 COLL VENOUS BLD VENIPUNCTURE: CPT | Performed by: ADVANCED PRACTICE MIDWIFE

## 2021-09-30 PROCEDURE — 85018 HEMOGLOBIN: CPT | Performed by: ADVANCED PRACTICE MIDWIFE

## 2021-09-30 PROCEDURE — 84146 ASSAY OF PROLACTIN: CPT | Performed by: ADVANCED PRACTICE MIDWIFE

## 2021-09-30 ASSESSMENT — EDINBURGH POSTNATAL DEPRESSION SCALE (EPDS)
I HAVE FELT SCARED OR PANICKY FOR NO GOOD REASON: NO, NOT AT ALL
THE THOUGHT OF HARMING MYSELF HAS OCCURRED TO ME: NEVER
THINGS HAVE BEEN GETTING ON TOP OF ME: NO, MOST OF THE TIME I HAVE COPED QUITE WELL
I HAVE BEEN SO UNHAPPY THAT I HAVE HAD DIFFICULTY SLEEPING: NOT AT ALL
I HAVE BEEN ANXIOUS OR WORRIED FOR NO GOOD REASON: NO, NOT AT ALL
I HAVE BEEN SO UNHAPPY THAT I HAVE BEEN CRYING: NO, NEVER
I HAVE BLAMED MYSELF UNNECESSARILY WHEN THINGS WENT WRONG: NO, NEVER
I HAVE LOOKED FORWARD WITH ENJOYMENT TO THINGS: AS MUCH AS I EVER DID
I HAVE FELT SAD OR MISERABLE: NOT VERY OFTEN
TOTAL SCORE: 2
I HAVE BEEN ABLE TO LAUGH AND SEE THE FUNNY SIDE OF THINGS: AS MUCH AS I ALWAYS COULD

## 2021-09-30 NOTE — LETTER
9/30/2021         RE: Edel Castro  [address] [facility] [address]    Dr. Mehnaz Major:    I saw your patient, Edel Castro,with her baby Carole for [facility] Lact[facility] at the [facility] today.  She has had low milk supply with two children, in spite of taking all possible measures to increase her supply.  There can be an association between low milk supply and metabolic syndrome/insulin resistance, and Edel would very much like to resume treatment for her metabolic syndrome in hopes that she might see some effect on her milk supply.  She has previously taken 500 mg metformin bid for this.  Although this is more in the realm of gynecology and medicine, I did agree to prescribe one month of metformin, given her history and agreement to meet with OB/GYN and/or you to discuss continuing management.  I also did some labwork (prolactin level, Hgb, quant HCG, testosterone and DHEAS) to rule out other causes of low milk supply and add information for her ongoing management.  Please find the chart attached with my note.  Please feel free to contact me with any questions.  I look forward to following this pleasant family along with you as needed.    Chioma Pa, APRN, CNM, IBCLC                                Assessment:   1.  Seven week old infant with history of slow weight gain, now gaining weight well on breastfeeding with formula supplementation:  1 oz/day over last 10 days  2.  Not able to nurse at breast today--restless and unwilling to latch  3.  Mother with low milk supply, recurrent in 2 breastfeeding experiences  4.  History of PCOS and metabolic syndrome:  Abdominal obesity, elevated blood sugar and elevated BP.  Desire to restart Metformin.  5.  Does not appear to have IGT;  R/o other causes low milk supply    Plan:   1.  To continue nursing and pumping plan;  Edel feels she can continue this for the time being, and is very motivated to  pursue ways to increase milk supply.  2.  Reviewed that Carole needs 25-30 oz milk/day for good growth.  Could consider supplementing before breastfeeding, if that helps baby be more relaxed and breast and willing to feed.  3.  Discussed potential additional labwork to evaluate recurrent low milk supply:  Prolactin, Hgb, quant HCG, testosterone, DHEAS.  Discussed that prolactin is best assessed when drawn 20 min after breast stimulation, has wide normal range, does not always correlate with milk supply, and cannot be treated other than with methods already being used.  She would like to proceed.  4.  Discussed use of hospital-grade pump for increased stimulation, and given info on how to inquire with insurance company re: coverage.  Edel will consider.  5.  To continue with current dietary supplements--moringa, goat's rue and milk thistle--if helpful for supply.  If no results, do not need to continue with these.  6.  Agreed to start metformin 500 mg bid for one month, with advice to see PCP and/or OB/GYN for continuing management of metabolic syndrome.  7.  To see pediatric provider and PCP as planned, schedule OB/GYN visit to discuss PCOS/metabolic syndrome, and see lactation as needed.      Subjective: Edel is here today for a follow up lactation visit for low milk supply.  She was first seen by ARYAN Hernandez CNP, IBCLC, 6 days ago for a history of slow growth/low milk supply, and has also been followed by Wanda Luo, IBCLC, private lactation consultant.  Baby Carole transferred about 0.4 oz of milk at lactation visit last week and is requiring significant supplementation.  Edel also had this concern in her first breastfeeding experience, and is seeking further evaluation, given her known PCOS and metabolic syndrome.  She has already had some labwork done, including a 2-hr GTT for postpartum follow-up on her GDM--fasting blood sugar was 107 and 2h was 86--and A1C which was 5.2.  She does occasional  "blood sugar checks at home and finds her glucose \"all over the place.\"  Thyroid screening was normal postpartum.  She was anemic in the early postpartum days (Hgb 9) but has not had this rechecked.  Edel would like to restart metformin--had previously taken 500 mg bid--in hopes of improving milk supply and managing her insulin resistance.  She had been on this previous to her pregnancies (although she conceived spontaneously) and tolerated it well.  She is currently taking several supplements for milk supply, including milk thistle and moringa, and will be adding goat's rue. She has previously tried use of Reglan as a galactogogue and did not find it helpful.  She also plans to work on weight loss using a low-carb diet; has a history of a gastric sleeve procedure after which she lost 60#, but has regained about half of that with her pregnancies.    Hospital Course: Induced for GDM with cervical ripening and about 6h Pitocin.  Did require magnesium sulfate for severe postpartum pre-eclampsia.  Infant with some hypoglycemia requiring supplementation with 24 jefe formula. Given nipple shield in hospital to assist with latch.      Mother's Relevant Med/Surg History: GDM this pregnancy, history of chronic HTN, PCOS, history of gastric sleeve bariatric procedure, history of depression/ anxiety on Zoloft    Breast Surgery: none    Breastfeeding Goals: to give as much breastmilk as possible    Previous Breastfeeding Experience: First child born at 36 weeks and was in NICU--had low milk supply with that child and exclusively pumped x 5 months.    Infant's name: Carole  Infant's bday: [birth year]  Gestational age: 39 wk  Infant's birth weight: 7 # 6.9 oz    Mode of delivery: vaginal  Pediatric Provider: Dr. Perez  Discharge weight: 7 # 4.9 oz  Recent weights:  8/20/21:  7 # 3 oz  8/27/21:  7 # 7.5 oz  9/20/21:  8 # 7 oz  9/24/21:  8 # 13.4 oz    Frequency and duration of feedings: every 2 - 2 1/2 hours, 15 " min/side  Swallows audible per mother: for first few minutes  Numbers of feedings in 24 hours: 8  Number urines per day: 8+  Number of stools per day and their color: 4-5, yellow seedy    Supplementation: 2 1/2 - 4 oz, with a combination of pumped milk and formula   Pumpin-3 times/day, yielding 1 - 3 oz, average 1 1/2 oz;  Using Spectra      Objective/Physical exam:   Mother: Noticed breasts grew larger and areolas darkened during pregnancy but did not notice primary engorgement when her milk came in.    Her nipples are everted, the areola is compressible, the breast is soft.  Breasts do not appear widely spaced;  Normal shaping.    Sore nipples: no  EPDS: 2    Assessment of infant: 14.8% Weight for age percentile   Age today: 7 weeks  Today's weight: 9 # 2 oz  Amount of milk transferred:  None.  Infant very restless at breast and would not nurse.    Baby has full flexion of arms and legs, normal tone, behavior is alert and active, respirations are normal, skin is normal, hydration is normal, jaw is normal size and alignment, palate is normal, frenulum is normal, baby can lateralize tongue, has adequate tongue lift, and tongue can protrude past bottom gum line.    Suck exam:  Baby has strong, coordinated suck  with good  tongue cupping    Baby thrush: none   Jaundice: none     Feeding assessment: Baby can hold suction with tongue while at the breast, but did not sustain latch today.    Alignment: The baby was flex relaxed. Baby's head was aligned with its trunk. Baby did face mother. Baby was in cross cradle position today.     Areolar Grasp: Baby was able to open mouth wide. Baby's lips were not pursed. Baby's lips did flange outward. Tongue was visible over bottom gum. Baby had complete seal.     Areolar Compression: Baby did not make rhythmic motion at breast today. There were no clicking or smacking sounds. There was no severe nipple discomfort.     Audible swallowing: Baby made no quiet sounds of  swallowing: There was no noted milk ejection reflex.  Milk supply is low.    BP (!) 150/98 (BP Location: Right arm, Patient Position: Sitting, Cuff Size: Adult Large)   LMP 2020   Breastfeeding Yes    Repeat BP at end of visit:  130/85    OB History    Para Term  AB Living   2 2 1 1 0 2   SAB TAB Ectopic Multiple Live Births   0 0 0 0 2      # Outcome Date GA Lbr Yunior/2nd Weight Sex Delivery Anes PTL Lv   2 Term 21 39w0d 04:54 / 01:23 3.37 kg (7 lb 6.9 oz) F Vag-Spont EPI N SONIYA      Complications: Fetal Intolerance      Name: WILFREDO JUDGE-LIZY      Apgar1: 6  Apgar5: 9   1                 Current Outpatient Medications:      acetaminophen (TYLENOL) 325 MG tablet, Take 2 tablets (650 mg) by mouth every 4 hours as needed for mild pain or fever (greater than or equal to 38  C /100.4  F (oral) or 38.5  C/ 101.4  F (core).), Disp: 20 tablet, Rfl: 0     blood glucose ([employer] BLOOD GLUCOSE TEST) test strip, Dispense item covered by pt ins. O99.810 Gestational DM - Test 4 times/day. New diabetes, Disp: , Rfl:      Blood Glucose Monitoring Suppl ([facility] BLOOD GLUCOSE MONITOR) w/Device KIT, Dispense meter covered by pt insurance has Gestational DM - Test 4 times/day, Disp: , Rfl:      cholecalciferol (VITAMIN D3) 125 mcg (5000 units) capsule, Take 125 mcg by mouth daily, Disp: , Rfl:      IRON-B12-VIT C-FA-IFC PO, , Disp: , Rfl:      lancets 28G MISC, Dispense item covered by pt ins. O99.810 Gestational DM - Test 4 times/day., Disp: , Rfl:      metFORMIN (GLUCOPHAGE) 500 MG tablet, Take 1 tablet (500 mg) by mouth 2 times daily (with meals), Disp: 60 tablet, Rfl: 0     Prenatal MV-Min-Fe Fum-FA-DHA (PRENATAL 1 PO), , Disp: , Rfl:      sertraline (ZOLOFT) 100 MG tablet, [SERTRALINE (ZOLOFT) 100 MG TABLET] TAKE 1 TABLET(100 MG) BY MOUTH DAILY, Disp: 90 tablet, Rfl: 1  Past Medical History:   Diagnosis Date     Depressive disorder     anxiety     Diabetes (H)     gestational     Hypertension      history      Past Surgical History:   Procedure Laterality Date     GASTRIC BYPASS  2014    sleeve     Family History   Problem Relation Age of Onset     Diabetes Mother         pre-diabetic     No Known Problems Father      No Known Problems Brother        Time spent:  Chart review/prechartin min prior to day of service  Face-to-face visit:   68 min   Documentation:  20 min   Total time spent on day of service: 88 min    NATALY Metz, CNM, IBCLC

## 2021-09-30 NOTE — PATIENT INSTRUCTIONS
1.  Continue nursing and pumping plan, as it seems like this feels manageable for you for the time being.   2.  Carole needs 25-30 oz milk/day for good growth, and she is growing well, so you are doing great with that!  You could consider supplementing before breastfeeding, if that helps her be more relaxed at breast and willing to feed.  3.  Today we did additional labwork to evaluate your recurrent low milk supply:  Prolactin, Hgb, quant HCG, testosterone and DHEAS.  It's important to know that prolactin is best assessed when drawn 20 min after breast stimulation, has wide normal range, does not always correlate with milk supply, and cannot be treated other than by the things you are already doing, but it can add some context to your experience.  4.  You can consider use of hospital-grade pump for increased stimulation--see below for info.    5.  Continue with current dietary supplements--moringa, goat's rue and milk thistle--if it is helpful for supply.  If no results, do not need to continue with these.  6.  Start metformin 500 mg twice daily.  I gave you one month's supply, so that you have time to establish care for your metabolic syndrome/insulin resistance with your primary, your OB/GYN, or an endocrine doctor.    7.  See your pediatric provider and PCP as planned, schedule OB/GYN visit to discuss PCOS/metabolic syndrome, and see lactation as needed.    _____________    Book:  Making More Milk, by Christina Sanders and Chani Javier    ____________    Regarding hospital grade pump:      1.  Call insurance company to check coverage and if any additional information is needed.  Ask if insurance will cover hospital grade pump, which is pump type     2.  Ask if you can get this pump from [email or web address], and if not, which medical equipment company you need to use    3.  Ask if you need a prescription or letter of medical necessity.  If so, ask the insurance company to send the form to our  clinic and we can fill it out.    Cost for pump rental is around $88/month from [employer] if it is not covered by insurance, plus $60 for the tubing set (if you do not already have a Medela pump)    (Cost if rented from [facility] is about $63/month;  From [facility] is about $70/month)

## 2021-10-01 LAB
DHEA-S SERPL-MCNC: 51 UG/DL (ref 35–430)
TESTOST SERPL-MCNC: 14 NG/DL (ref 14–53)

## 2021-10-03 PROBLEM — E88.819 INSULIN RESISTANCE: Status: ACTIVE | Noted: 2021-10-03

## 2021-10-03 PROBLEM — O92.70 LACTATION PROBLEM: Status: ACTIVE | Noted: 2021-10-03

## 2021-10-05 ENCOUNTER — VIRTUAL VISIT (OUTPATIENT)
Dept: FAMILY MEDICINE | Facility: CLINIC | Age: 36
End: 2021-10-05
Payer: COMMERCIAL

## 2021-10-05 DIAGNOSIS — O92.70 LACTATION PROBLEM: ICD-10-CM

## 2021-10-05 DIAGNOSIS — E28.2 POLYCYSTIC OVARY SYNDROME: Primary | ICD-10-CM

## 2021-10-05 DIAGNOSIS — E88.819 INSULIN RESISTANCE: ICD-10-CM

## 2021-10-05 PROCEDURE — 99213 OFFICE O/P EST LOW 20 MIN: CPT | Mod: 95 | Performed by: NURSE PRACTITIONER

## 2021-10-05 RX ORDER — METFORMIN HCL 500 MG
500 TABLET, EXTENDED RELEASE 24 HR ORAL 2 TIMES DAILY WITH MEALS
Qty: 180 TABLET | Refills: 1 | Status: SHIPPED | OUTPATIENT
Start: 2021-10-05 | End: 2022-03-07

## 2021-10-05 NOTE — PROGRESS NOTES
"Edel is a 36 year old who is being evaluated via a billable video visit.      How would you like to obtain your AVS? MyChart  If the video visit is dropped, the invitation should be resent by: Text to cell phone: [phone]  Will anyone else be joining your video visit? No      Video Start Time: 1726    Assessment & Plan     Polycystic ovary syndrome    - metFORMIN (GLUCOPHAGE-XR) 500 MG 24 hr tablet  Dispense: 180 tablet; Refill: 1    Insulin resistance    - metFORMIN (GLUCOPHAGE-XR) 500 MG 24 hr tablet  Dispense: 180 tablet; Refill: 1    Lactation problem    - metFORMIN (GLUCOPHAGE-XR) 500 MG 24 hr tablet  Dispense: 180 tablet; Refill: 1  She notes that her milk supply has doubled per pumping session since adding the Metformin  She continues to spot check her glucose levels along with checking fasting  I did send the extended release Metformin since she was experiencing GI upset with the immediate release, she has used the extended release in the past and had no GI issues.   She will tally up what her 24 hour milk supply is and discuss with lactation team  Prior to Metformin use, her 24 hour supply was 10 ounces.   Currently getting 3 to 4 ounces per session.       Review of prior external note(s) from - Outside records from [facility]  23 minutes spent on the date of the encounter doing chart review, review of outside records, review of test results, interpretation of tests, patient visit and documentation        BMI:   Estimated body mass index is 36.76 kg/m  as calculated from the following:    Height as of 8/12/21: 1.575 m (5' 2\").    Weight as of 8/12/21: 91.2 kg (201 lb).       See Patient Instructions    Return in about 4 months (around 2/5/2022) for Follow up, Routine preventive, with me, in person, fasting lab work, anxiety, depression check.    Velvet Everett NP  [facility]    Papo Hollingsworth is a 36 year old who presents for the following health issues: " discuss lactation issues and recommendations from lactation team    HPI patient has been working closely with the lactation team ever since the birth of her daughter.  Patient reports shallow latch with poor stool, no damage and slow weight gain.  Patient was provided a nipple shield during her hospital stay but did not feel that it was not assisting with breast-feeding.  She does have a history of PCOS and gestational diabetes while pregnant with her daughter, high blood pressure was noted after delivery.  She previously underwent gastric sleeve surgery in 2012.  She did have her thyroid panel checked at her OB clinic which came back unremarkable on September 7.  A functional oral assessment was conducted on the baby which showed normal reflexes, tongue lateralization and extension and frenulum underneath her tongue all which were normal in appearance.  She was found to have a central gain reflex.  Care plan ensued Rox consumed enough calories which resulted in improved stool output and weight gain.  Pumping plan for Edel at that time involved pumping 9 times per 24-hour include great in the morning with properly fitted phalanges 24 mm with hand expression afterwards.  Edel has been ensuring adequate fiber, protein, potassium and magnesium intake.  Average milk output when starting pumping and nutrition plan was 1 to 1-1/2 ounces total per pumping session.  Her maximum milk output in 24 hours was a total of 10 ounces.  She was recently initiated on Metformin due to her history of PCOS, there has been evidence that Metformin will assist increasing her lactation/milk production as well.  She does note some GI upset since starting Metformin, she previously for an extended release when she was first diagnosed with PCOS.  The current prescription she is on is the immediate release.  She does report that her ounces per session has increased from 1 to 1-1/2 ounces up to 3 to 4 ounces per session now.  She has not yet  measured her total 24-hour output.  She has been checking her glucose levels at home.  She reports fasting levels between 70-95 and random glucose level of 100.  She is asymptomatic with any of these readings.  She is thinking about her weight loss goals after her baby is done breast-feeding, she would like to get exercising and weight loss and increasing her hydration.  She does feel hungry all of the time currently due to breast-feeding.  She does inform me today that she did get a ParaGard IUD placed today by her OB, her last Pap smear was completed in 2020 and was negative for ASCUS or HPV.        Review of Systems   CONSTITUTIONAL: NEGATIVE for fever, chills, change in weight  ENT/MOUTH: NEGATIVE for ear, mouth and throat problems  RESP: NEGATIVE for significant cough or SOB  CV: NEGATIVE for chest pain, palpitations or peripheral edema      Objective    Vitals - Patient Reported  Systolic (Patient Reported): 120  Diastolic (Patient Reported): 86  Weight (Patient Reported): 87.5 kg (193 lb)        Physical Exam   GENERAL: Healthy, alert and no distress  EYES: Eyes grossly normal to inspection.  No discharge or erythema, or obvious scleral/conjunctival abnormalities.  RESP: No audible wheeze, cough, or visible cyanosis.  No visible retractions or increased work of breathing.    SKIN: Visible skin clear. No significant rash, abnormal pigmentation or lesions.  NEURO: Cranial nerves grossly intact.  Mentation and speech appropriate for age.  PSYCH: Mentation appears normal, affect normal/bright, judgement and insight intact, normal speech and appearance well-groomed.                Video-Visit Details    Type of service:  Video Visit    Video End Time:9303    Originating Location (pt. Location): Home    Distant Location (provider location):  [facility]     Platform used for Video Visit: [employer]

## 2021-10-11 ENCOUNTER — HEALTH MAINTENANCE LETTER (OUTPATIENT)
Age: 36
End: 2021-10-11

## 2021-10-12 ENCOUNTER — MEDICAL CORRESPONDENCE (OUTPATIENT)
Dept: HEALTH INFORMATION MANAGEMENT | Facility: CLINIC | Age: 36
End: 2021-10-12

## 2022-01-05 ENCOUNTER — IMMUNIZATION (OUTPATIENT)
Dept: NURSING | Facility: CLINIC | Age: 37
End: 2022-01-05
Payer: COMMERCIAL

## 2022-01-05 PROCEDURE — 91305 COVID-19,PF,PFIZER (12+ YRS): CPT

## 2022-01-05 PROCEDURE — 0051A COVID-19,PF,PFIZER (12+ YRS): CPT

## 2022-01-26 ENCOUNTER — IMMUNIZATION (OUTPATIENT)
Dept: NURSING | Facility: CLINIC | Age: 37
End: 2022-01-26
Attending: PEDIATRICS
Payer: COMMERCIAL

## 2022-01-26 PROCEDURE — 91305 COVID-19,PF,PFIZER (12+ YRS): CPT

## 2022-01-26 PROCEDURE — 0052A COVID-19,PF,PFIZER (12+ YRS): CPT

## 2022-02-16 ENCOUNTER — MEDICAL CORRESPONDENCE (OUTPATIENT)
Dept: HEALTH INFORMATION MANAGEMENT | Facility: CLINIC | Age: 37
End: 2022-02-16
Payer: COMMERCIAL

## 2022-02-17 ENCOUNTER — OFFICE VISIT (OUTPATIENT)
Dept: FAMILY MEDICINE | Facility: CLINIC | Age: 37
End: 2022-02-17
Payer: COMMERCIAL

## 2022-02-17 VITALS
WEIGHT: 212 LBS | BODY MASS INDEX: 39.01 KG/M2 | HEART RATE: 81 BPM | HEIGHT: 62 IN | SYSTOLIC BLOOD PRESSURE: 160 MMHG | DIASTOLIC BLOOD PRESSURE: 106 MMHG | OXYGEN SATURATION: 97 %

## 2022-02-17 DIAGNOSIS — I10 ESSENTIAL HYPERTENSION: ICD-10-CM

## 2022-02-17 DIAGNOSIS — Z97.5 IUD (INTRAUTERINE DEVICE) IN PLACE: ICD-10-CM

## 2022-02-17 DIAGNOSIS — Z00.00 ANNUAL PHYSICAL EXAM: Primary | ICD-10-CM

## 2022-02-17 DIAGNOSIS — E66.01 MORBID OBESITY (H): ICD-10-CM

## 2022-02-17 DIAGNOSIS — Z23 NEED FOR VACCINATION: ICD-10-CM

## 2022-02-17 DIAGNOSIS — F33.0 MILD EPISODE OF RECURRENT MAJOR DEPRESSIVE DISORDER (H): ICD-10-CM

## 2022-02-17 DIAGNOSIS — E28.2 POLYCYSTIC OVARY SYNDROME: ICD-10-CM

## 2022-02-17 DIAGNOSIS — F41.9 ANXIETY: ICD-10-CM

## 2022-02-17 DIAGNOSIS — Z71.89 ACP (ADVANCE CARE PLANNING): ICD-10-CM

## 2022-02-17 LAB
ANION GAP SERPL CALCULATED.3IONS-SCNC: 10 MMOL/L (ref 5–18)
BUN SERPL-MCNC: 14 MG/DL (ref 8–22)
CALCIUM SERPL-MCNC: 9.1 MG/DL (ref 8.5–10.5)
CHLORIDE BLD-SCNC: 108 MMOL/L (ref 98–107)
CHOLEST SERPL-MCNC: 171 MG/DL
CLUE CELLS: ABNORMAL
CO2 SERPL-SCNC: 21 MMOL/L (ref 22–31)
CREAT SERPL-MCNC: 1.32 MG/DL (ref 0.6–1.1)
FASTING STATUS PATIENT QL REPORTED: ABNORMAL
GFR SERPL CREATININE-BSD FRML MDRD: 54 ML/MIN/1.73M2
GLUCOSE BLD-MCNC: 100 MG/DL (ref 70–125)
HBA1C MFR BLD: 6.2 % (ref 0–5.6)
HDLC SERPL-MCNC: 59 MG/DL
HGB BLD-MCNC: 11.7 G/DL (ref 11.7–15.7)
LDLC SERPL CALC-MCNC: 76 MG/DL
POTASSIUM BLD-SCNC: 3.9 MMOL/L (ref 3.5–5)
SODIUM SERPL-SCNC: 139 MMOL/L (ref 136–145)
TRICHOMONAS, WET PREP: ABNORMAL
TRIGL SERPL-MCNC: 178 MG/DL
TSH SERPL DL<=0.005 MIU/L-ACNC: 0.92 UIU/ML (ref 0.3–5)
WBC'S/HIGH POWER FIELD, WET PREP: ABNORMAL
YEAST, WET PREP: ABNORMAL

## 2022-02-17 PROCEDURE — 82306 VITAMIN D 25 HYDROXY: CPT | Performed by: NURSE PRACTITIONER

## 2022-02-17 PROCEDURE — 80061 LIPID PANEL: CPT | Performed by: NURSE PRACTITIONER

## 2022-02-17 PROCEDURE — 84443 ASSAY THYROID STIM HORMONE: CPT | Performed by: NURSE PRACTITIONER

## 2022-02-17 PROCEDURE — 83036 HEMOGLOBIN GLYCOSYLATED A1C: CPT | Performed by: NURSE PRACTITIONER

## 2022-02-17 PROCEDURE — 99214 OFFICE O/P EST MOD 30 MIN: CPT | Mod: 25 | Performed by: NURSE PRACTITIONER

## 2022-02-17 PROCEDURE — 85018 HEMOGLOBIN: CPT | Performed by: NURSE PRACTITIONER

## 2022-02-17 PROCEDURE — 36415 COLL VENOUS BLD VENIPUNCTURE: CPT | Performed by: NURSE PRACTITIONER

## 2022-02-17 PROCEDURE — 80048 BASIC METABOLIC PNL TOTAL CA: CPT | Performed by: NURSE PRACTITIONER

## 2022-02-17 PROCEDURE — 90471 IMMUNIZATION ADMIN: CPT | Performed by: NURSE PRACTITIONER

## 2022-02-17 PROCEDURE — 90686 IIV4 VACC NO PRSV 0.5 ML IM: CPT | Performed by: NURSE PRACTITIONER

## 2022-02-17 PROCEDURE — 87624 HPV HI-RISK TYP POOLED RSLT: CPT | Performed by: NURSE PRACTITIONER

## 2022-02-17 PROCEDURE — G0123 SCREEN CERV/VAG THIN LAYER: HCPCS | Performed by: NURSE PRACTITIONER

## 2022-02-17 PROCEDURE — 87210 SMEAR WET MOUNT SALINE/INK: CPT | Performed by: NURSE PRACTITIONER

## 2022-02-17 PROCEDURE — 99395 PREV VISIT EST AGE 18-39: CPT | Mod: 25 | Performed by: NURSE PRACTITIONER

## 2022-02-17 RX ORDER — SERTRALINE HYDROCHLORIDE 100 MG/1
150 TABLET, FILM COATED ORAL DAILY
Qty: 45 TABLET | Refills: 0 | Status: SHIPPED | OUTPATIENT
Start: 2022-02-17 | End: 2022-04-05

## 2022-02-17 RX ORDER — LABETALOL 100 MG/1
100 TABLET, FILM COATED ORAL 2 TIMES DAILY
Qty: 60 TABLET | Refills: 0 | Status: SHIPPED | OUTPATIENT
Start: 2022-02-17 | End: 2022-04-05

## 2022-02-17 NOTE — PROGRESS NOTES
SUBJECTIVE:   CC: Edel Castro is an 36 year old woman who presents for preventive health visit/med check. Due for pap today and flu vaccine. No longer taking her medication for management of her PCOS due to recent pregnancy.  Copper IUD in place, inserted October 2021 by her OB/GYN.  She did have a light period about 2 weeks ago which was her 1st period since having her child.  She does not have a healthcare directive on file.      Patient has been advised of split billing requirements and indicates understanding: Yes  Healthy Habits:     Getting at least 3 servings of Calcium per day:  Yes    Bi-annual eye exam:  Yes    Dental care twice a year:  Yes    Sleep apnea or symptoms of sleep apnea:  Daytime drowsiness    Diet:  Diabetic and Carbohydrate counting    Frequency of exercise:  None    Taking medications regularly:  Yes    Medication side effects:  None    PHQ-2 Total Score: 1    Additional concerns today:  No      Hypertension Follow-up      Do you check your blood pressure regularly outside of the clinic? No Not currently on Rx but has been elevated in the past. She is breast feeding right now.     Are you following a low salt diet? No    Are your blood pressures ever more than 140 on the top number (systolic) OR more   than 90 on the bottom number (diastolic), for example 140/90? Yes   Denies radiation, diaphoresis, shortness of breath, dizziness, syncope, nausea, palpitations, and associated with activity.   Smoker: no  ETOH: no  Vape: no  Street drugs: no    Depression and Anxiety Follow-Up    How are you doing with your depression since your last visit? No change, Rx: Zoloft 100 mg daily    How are you doing with your anxiety since your last visit?  Worsened     Are you having other symptoms that might be associated with depression or anxiety? Yes:  panic feeling, some irritability    Have you had a significant life event? Job Concerns and Health Concerns     Do you have any concerns with your  use of alcohol or other drugs? No     Triggers: job stress, co-parenting relationship    Therapist: yes, monthly    Stress reduction/self care: alone time after kids go to sleep, date night Saturdays    No thoughts of self harm    Social History     Tobacco Use     Smoking status: Never Smoker     Smokeless tobacco: Never Used   Substance Use Topics     Alcohol use: Never     Drug use: Never     PHQ 4/30/2020 5/14/2020 8/6/2021   PHQ-9 Total Score 5 5 3   Q9: Thoughts of better off dead/self-harm past 2 weeks Not at all Not at all Not at all     MECHELLE-7 SCORE 4/30/2020 5/14/2020 8/6/2021   Total Score 9 5 2         Suicide Assessment Five-step Evaluation and Treatment (SAFE-T)      Today's PHQ-2 Score:   PHQ-2 ( 1999 [employer]) 2/17/2022   Q1: Little interest or pleasure in doing things 1   Q2: Feeling down, depressed or hopeless 0   PHQ-2 Score 1   PHQ-2 Total Score (12-17 Years)- Positive if 3 or more points; Administer PHQ-A if positive -   Q1: Little interest or pleasure in doing things Several days   Q2: Feeling down, depressed or hopeless Not at all   PHQ-2 Score 1       Abuse: Current or Past (Physical, Sexual or Emotional) - No  Do you feel safe in your environment? Yes        Social History     Tobacco Use     Smoking status: Never Smoker     Smokeless tobacco: Never Used   Substance Use Topics     Alcohol use: Never     If you drink alcohol do you typically have >3 drinks per day or >7 drinks per week? No    Alcohol Use 2/17/2022   Prescreen: >3 drinks/day or >7 drinks/week? No       Reviewed orders with patient.  Reviewed health maintenance and updated orders accordingly - Yes  Lab work is in process  Labs reviewed in EPIC  BP Readings from Last 3 Encounters:   02/17/22 (!) 160/106   09/30/21 130/85   08/14/21 127/85    Wt Readings from Last 3 Encounters:   02/17/22 96.2 kg (212 lb)   08/12/21 91.2 kg (201 lb)   05/14/20 74.8 kg (165 lb)                    Breast Cancer Screening:    Breast CA Risk Assessment  "(S-7) 1/10/2022 1/10/2022 2/17/2022   Do you have a family history of breast, colon, or ovarian cancer? No / Unknown No / Unknown No / Unknown         Patient under 40 years of age: Routine Mammogram Screening not recommended.   Pertinent mammograms are reviewed under the imaging tab.    History of abnormal Pap smear: NO - age 30-65 PAP every 5 years with negative HPV co-testing recommended  PAP / HPV 10/10/2016   HPV See Scanned Report     Reviewed and updated as needed this visit by clinical staff   Tobacco  Allergies  Meds              Reviewed and updated as needed this visit by Provider                 Past Medical History:   Diagnosis Date     Depressive disorder     anxiety     Diabetes (H)     gestational     Hypertension     history         Review of Systems  INTEGUMENTARU/SKIN: NEGATIVE for worrisome rashes, moles or lesions  ENT: NEGATIVE for ear, mouth and throat problems  RESP: NEGATIVE for significant cough or SOB  BREAST: NEGATIVE for masses, tenderness or discharge  CV: NEGATIVE for chest pain, palpitations or peripheral edema  GI: NEGATIVE for nausea, abdominal pain, heartburn, or change in bowel habits  : NEGATIVE for unusual urinary or vaginal symptoms. Periods are regular.  MUSCULOSKELETAL: NEGATIVE for significant arthralgias or myalgia  NEURO: NEGATIVE for weakness, dizziness or paresthesias  ENDOCRINE: NEGATIVE for temperature intolerance, skin/hair changes  HEME/ALLERGY/IMMUNE: NEGATIVE for bleeding problems       OBJECTIVE:   BP (!) 140/96 (BP Location: Right arm, Patient Position: Sitting, Cuff Size: Adult Large)   Pulse 81   Ht 1.575 m (5' 2\")   Wt 96.2 kg (212 lb)   LMP 02/03/2022   SpO2 97%   Breastfeeding Yes   BMI 38.78 kg/m    Physical Exam  GENERAL: healthy, alert and no distress  EYES: Eyes grossly normal to inspection, PERRL and conjunctivae and sclerae normal  HENT: ear canals and TM's normal, nose and mouth without ulcers or lesions  NECK: no adenopathy, no " asymmetry, masses, or scars and thyroid normal to palpation  RESP: lungs clear to auscultation - no rales, rhonchi or wheezes  BREAST: normal without masses, tenderness or nipple discharge and no palpable axillary masses or adenopathy  CV: regular rate and rhythm, normal S1 S2, no S3 or S4, no murmur, click or rub, no peripheral edema and peripheral pulses strong  ABDOMEN: soft, nontender, no hepatosplenomegaly, no masses and bowel sounds normal   (female): normal female external genitalia, normal urethral meatus, vaginal mucosa pink, moist, well rugated, and normal cervix/adnexa/uterus without masses. IUD strings well visualized, moderate amount of thick, yellow-green drainage present today but no odor detected.  RECTAL: normal sphincter tone, no rectal masses  MS: no gross musculoskeletal defects noted, no edema  SKIN: no suspicious lesions or rashes, pink, warm and dry  NEURO: Normal strength and tone, mentation intact and speech normal  PSYCH: mentation appears normal, affect normal/bright  LYMPH: no cervical, supraclavicular, axillary, or inguinal adenopathy    Diagnostic Test Results:  Labs reviewed in Epic    ASSESSMENT/PLAN:   (Z00.00) Annual physical exam  (primary encounter diagnosis)  Comment:   Plan: BASIC METABOLIC PANEL, Hemoglobin, Lipid         Profile, Vitamin D Deficiency, Pap screen with         HPV - recommended age 30 - 65 years, REVIEW OF         HEALTH MAINTENANCE PROTOCOL ORDERS, Wet prep -         Clinic Collect            (I10) Essential hypertension  Comment:   Plan: labetalol (NORMODYNE) 100 MG tablet  Previously has had elevated blood pressure, will initiate her on labetalol 100 mg twice daily today since she continues to breast-feed.  I will see her back in clinic in 3 to 4 weeks for follow-up  No acute red flag symptoms noted today on examination            (F41.9) Anxiety  Comment:   Plan: sertraline (ZOLOFT) 100 MG tablet  Dose increased to 150 mg daily due to her increased anxiety  "symptoms.  She will follow up with me again in 3 to 4 weeks to determine if the dose adjustment has been effective  She will continue to see her therapist monthly  No concerns for SI  Work on getting in some regular exercise and making more time for self-care            (F33.0) Mild episode of recurrent major depressive disorder (H)  Comment:   Plan: sertraline (ZOLOFT) 100 MG tablet        We did review and discuss her screening scores today.    (E28.2) Polycystic ovary syndrome  Comment:   Plan: Recommend that she restart her Metformin for management of her PCOS and to see if this will assist with weight loss    (Z97.5) IUD (intrauterine device) in place  Comment:   Plan: Copper IUD inserted by her OB/GYN in October 2021, she denies having any issues with the IUD    (E66.01) Morbid obesity (H)  Comment:   Plan: Hemoglobin A1c, TSH        We did discuss the importance of regular physical activity, did ask her to set a goal for herself when he gets closer to the spring so she can get out on some walks.    (Z23) Need for vaccination  Comment:   Plan: INFLUENZA VACCINE IM >6 MO VALENT IIV4         (AFLURIA/FLUZONE)            (Z71.89) ACP (advance care planning)  Comment:   Plan: Honoring choices packet given to patient for completion, she will return to the clinic once is notarized    Patient has been advised of split billing requirements and indicates understanding: Yes    COUNSELING:  Reviewed preventive health counseling, as reflected in patient instructions       Regular exercise       Healthy diet/nutrition       Vision screening       Advance Care Planning    Estimated body mass index is 38.78 kg/m  as calculated from the following:    Height as of this encounter: 1.575 m (5' 2\").    Weight as of this encounter: 96.2 kg (212 lb).    Weight management plan: Discussed healthy diet and exercise guidelines    She reports that she has never smoked. She has never used smokeless tobacco.      Counseling " Resources:  ATP IV Guidelines  Pooled Cohorts Equation Calculator  Breast Cancer Risk Calculator  BRCA-Related Cancer Risk Assessment: FHS-7 Tool  FRAX Risk Assessment  ICSI Preventive Guidelines  Dietary Guidelines for Americans, 2010  USDA's MyPlate  ASA Prophylaxis  Lung CA Screening    Velvet Everett NP  [facility]

## 2022-02-21 LAB — DEPRECATED CALCIDIOL+CALCIFEROL SERPL-MC: 35 UG/L (ref 30–80)

## 2022-02-22 LAB
HUMAN PAPILLOMA VIRUS 16 DNA: NEGATIVE
HUMAN PAPILLOMA VIRUS 18 DNA: NEGATIVE
HUMAN PAPILLOMA VIRUS FINAL DIAGNOSIS: NORMAL
HUMAN PAPILLOMA VIRUS OTHER HR: NEGATIVE

## 2022-02-23 LAB
BKR LAB AP GYN ADEQUACY: NORMAL
BKR LAB AP GYN INTERPRETATION: NORMAL
BKR LAB AP HPV REFLEX: NORMAL
BKR LAB AP PREVIOUS ABNORMAL: NORMAL
PATH REPORT.COMMENTS IMP SPEC: NORMAL
PATH REPORT.COMMENTS IMP SPEC: NORMAL
PATH REPORT.RELEVANT HX SPEC: NORMAL

## 2022-02-24 ENCOUNTER — PATIENT OUTREACH (OUTPATIENT)
Dept: FAMILY MEDICINE | Facility: CLINIC | Age: 37
End: 2022-02-24
Payer: COMMERCIAL

## 2022-02-24 PROBLEM — R87.615 UNSATISFACTORY CERVICAL PAPANICOLAOU SMEAR: Status: ACTIVE | Noted: 2022-02-17

## 2022-03-14 ENCOUNTER — VIRTUAL VISIT (OUTPATIENT)
Dept: URGENT CARE | Facility: CLINIC | Age: 37
End: 2022-03-14
Payer: COMMERCIAL

## 2022-03-14 DIAGNOSIS — R11.10 VOMITING AND DIARRHEA: ICD-10-CM

## 2022-03-14 DIAGNOSIS — K52.9 GASTROENTERITIS: Primary | ICD-10-CM

## 2022-03-14 DIAGNOSIS — R19.7 VOMITING AND DIARRHEA: ICD-10-CM

## 2022-03-14 PROCEDURE — 99213 OFFICE O/P EST LOW 20 MIN: CPT | Mod: 95 | Performed by: PHYSICIAN ASSISTANT

## 2022-03-14 RX ORDER — ONDANSETRON 4 MG/1
4 TABLET, ORALLY DISINTEGRATING ORAL EVERY 8 HOURS PRN
Qty: 30 TABLET | Refills: 0 | Status: SHIPPED | OUTPATIENT
Start: 2022-03-14 | End: 2023-02-07

## 2022-03-14 NOTE — PROGRESS NOTES
Edel is a 36 year old who is being evaluated via a billable video visit.      Video Start Time: 4:26 PM    Assessment & Plan     Gastroenteritis  - ondansetron (ZOFRAN-ODT) 4 MG ODT tab; Take 1 tablet (4 mg) by mouth every 8 hours as needed for nausea    Vomiting and diarrhea    I will treat nausea with zofran, discussed making sure she is able to take in fluids. Follow up in person for further evaluation if worsening or not improving in the next 1-2 days.     Theresa Powell PA-C  Virtual Urgent Care  [facility]    Subjective   Edel is a 36 year old who presents for the following health issues: nausea, vomiting and diarrhea    HPI - Patient has been having nausea, vomiting and diarrhea for the last 3 days. She has not had fever, has not had abdominal pain, has not had known sick contacts. She says no one else at home has had similar symptoms. She is 6 months post partum and has an IUD in so does not think she could be pregnant. She has not had urinary symptoms, she has not had recent travel or recent antibiotics. She is requesting zofran to help with the nasuea. She says she has been able to keep fluids down but is nauseated all the time and wants something to help with that.     Review of Systems   Constitutional, HEENT, cardiovascular, pulmonary, gi and gu systems are negative, except as otherwise noted.      Objective           Vitals:  No vitals were obtained today due to virtual visit.    Physical Exam   GENERAL: alert, no distress and fatigued  EYES: Eyes grossly normal to inspection.  No discharge or erythema, or obvious scleral/conjunctival abnormalities.  RESP: No audible wheeze, cough, or visible cyanosis.  No visible retractions or increased work of breathing.    SKIN: Visible skin clear. No significant rash, abnormal pigmentation or lesions.  NEURO: Cranial nerves grossly intact.  Mentation and speech appropriate for age.  PSYCH: Mentation appears normal, affect  normal/bright, judgement and insight intact, normal speech and appearance well-groomed.      Video-Visit Details    Type of service:  Video Visit    Video End Time:4:31 PM    Originating Location (pt. Location): Home    Distant Location (provider location):  [facility]     Platform used for Video Visit: [employer]

## 2022-04-05 ENCOUNTER — OFFICE VISIT (OUTPATIENT)
Dept: FAMILY MEDICINE | Facility: CLINIC | Age: 37
End: 2022-04-05
Payer: COMMERCIAL

## 2022-04-05 VITALS
HEART RATE: 76 BPM | OXYGEN SATURATION: 97 % | DIASTOLIC BLOOD PRESSURE: 84 MMHG | SYSTOLIC BLOOD PRESSURE: 126 MMHG | BODY MASS INDEX: 37.46 KG/M2 | WEIGHT: 204.8 LBS

## 2022-04-05 DIAGNOSIS — I10 ESSENTIAL HYPERTENSION: Primary | ICD-10-CM

## 2022-04-05 DIAGNOSIS — F33.0 MILD EPISODE OF RECURRENT MAJOR DEPRESSIVE DISORDER (H): ICD-10-CM

## 2022-04-05 DIAGNOSIS — F41.9 ANXIETY: ICD-10-CM

## 2022-04-05 DIAGNOSIS — Z12.4 CERVICAL CANCER SCREENING: ICD-10-CM

## 2022-04-05 PROCEDURE — 99214 OFFICE O/P EST MOD 30 MIN: CPT | Performed by: NURSE PRACTITIONER

## 2022-04-05 PROCEDURE — 87624 HPV HI-RISK TYP POOLED RSLT: CPT | Performed by: NURSE PRACTITIONER

## 2022-04-05 PROCEDURE — G0123 SCREEN CERV/VAG THIN LAYER: HCPCS | Performed by: NURSE PRACTITIONER

## 2022-04-05 RX ORDER — SERTRALINE HYDROCHLORIDE 100 MG/1
150 TABLET, FILM COATED ORAL DAILY
Qty: 135 TABLET | Refills: 1 | Status: SHIPPED | OUTPATIENT
Start: 2022-04-05 | End: 2023-02-07

## 2022-04-05 RX ORDER — LABETALOL 100 MG/1
100 TABLET, FILM COATED ORAL 2 TIMES DAILY
Qty: 180 TABLET | Refills: 3 | Status: SHIPPED | OUTPATIENT
Start: 2022-04-05 | End: 2023-02-07

## 2022-04-05 ASSESSMENT — ANXIETY QUESTIONNAIRES
4. TROUBLE RELAXING: SEVERAL DAYS
1. FEELING NERVOUS, ANXIOUS, OR ON EDGE: SEVERAL DAYS
6. BECOMING EASILY ANNOYED OR IRRITABLE: MORE THAN HALF THE DAYS
7. FEELING AFRAID AS IF SOMETHING AWFUL MIGHT HAPPEN: NOT AT ALL
GAD7 TOTAL SCORE: 7
5. BEING SO RESTLESS THAT IT IS HARD TO SIT STILL: SEVERAL DAYS
GAD7 TOTAL SCORE: 7
2. NOT BEING ABLE TO STOP OR CONTROL WORRYING: SEVERAL DAYS
GAD7 TOTAL SCORE: 7
7. FEELING AFRAID AS IF SOMETHING AWFUL MIGHT HAPPEN: NOT AT ALL
3. WORRYING TOO MUCH ABOUT DIFFERENT THINGS: SEVERAL DAYS

## 2022-04-05 ASSESSMENT — PATIENT HEALTH QUESTIONNAIRE - PHQ9
SUM OF ALL RESPONSES TO PHQ QUESTIONS 1-9: 2
SUM OF ALL RESPONSES TO PHQ QUESTIONS 1-9: 2
10. IF YOU CHECKED OFF ANY PROBLEMS, HOW DIFFICULT HAVE THESE PROBLEMS MADE IT FOR YOU TO DO YOUR WORK, TAKE CARE OF THINGS AT HOME, OR GET ALONG WITH OTHER PEOPLE: SOMEWHAT DIFFICULT

## 2022-04-05 NOTE — PROGRESS NOTES
Assessment & Plan     Essential hypertension    - labetalol (NORMODYNE) 100 MG tablet  Dispense: 180 tablet; Refill: 3  BP well controlled, continue current regimen    Anxiety    - sertraline (ZOLOFT) 100 MG tablet  Dispense: 135 tablet; Refill: 1  Mood  stable on her current dose of sertraline 150 mg daily.  We did review and discuss her iggy and PHQ-9 screening scores, she declines adjusting her medications again today because she feels that overall she is doing well with her current dose and she continues to see her therapist 1-2 times per month.  No concerns for suicidal ideation today  Follow-up in clinic again in 6 months    Mild episode of recurrent major depressive disorder (H)    - sertraline (ZOLOFT) 100 MG tablet  Dispense: 135 tablet; Refill: 1  Mood  stable on her current dose of sertraline 150 mg daily.  We did review and discuss her iggy and PHQ-9 screening scores, she declines adjusting her medications again today because she feels that overall she is doing well with her current dose and she continues to see her therapist 1-2 times per month.  No concerns for suicidal ideation today  Follow-up in clinic again in 6 months    Cervical cancer screening    - Pap screen with HPV - recommended age 30 - 65 years  Recently completed her annual physical and her Pap results came back invalid, there was a time that cells collected for evaluation.  We will repeat her Pap today, she is menstruating today however.                   Return for Follow up, with me, in person, med check.    Velvet Everett NP  [place] [id number]  Edel is a 36 year old who presents for the following health issues     History of Present Illness       Mental Health Follow-up:  Patient presents to follow-up on Depression & Anxiety.Patient's depression since last visit has been:  Better  The patient is not having other symptoms associated with depression.  Patient's anxiety since last visit has been:   No change  The patient is not having other symptoms associated with anxiety.  Any significant life events: other  Patient is not feeling anxious or having panic attacks.  Patient has no concerns about alcohol or drug use.       Today's PHQ-9         PHQ-9 Total Score: 2  PHQ-9 Q9 Thoughts of better off dead/self-harm past 2 weeks :   (P) Not at all    How difficult have these problems made it for you to do your work, take care of things at home, or get along with other people: Somewhat difficult    Today's MECHELLE-7 Score: 7    Hypertension: She presents for follow up of hypertension.  She does not check blood pressure  regularly outside of the clinic. Outpatient blood pressures have not been over 140/90. She follows a low salt diet.     She eats 2-3 servings of fruits and vegetables daily.She consumes 0 sweetened beverage(s) daily.She exercises with enough effort to increase her heart rate 20 to 29 minutes per day.  She exercises with enough effort to increase her heart rate 4 days per week. She is missing 1 dose(s) of medications per week.  She is not taking prescribed medications regularly due to remembering to take.     Current Rx's: Zoloft 150 mg daily, Labetalol 100 mg BID.   Seeing her therapist PRN, usually one to two times per month  Triggers: none reported today  Self care: shopping alone in the store, walking outside  No thoughts of self harm or SI    Cervical cancer screening: Recently, she completed her annual physical and her Pap smear came back invalid, there was not enough cells collected for evaluation.  She is needing to repeat her Pap smear today.  She is menstruating today.            Review of Systems   CONSTITUTIONAL: NEGATIVE for fever, chills, change in weight  ENT/MOUTH: NEGATIVE for ear, mouth and throat problems  RESP: NEGATIVE for significant cough or SOB  CV: NEGATIVE for chest pain, palpitations or peripheral edema      Objective    /84 (BP Location: Left arm, Patient Position:  Sitting, Cuff Size: Adult Large)   Pulse 76   Wt 92.9 kg (204 lb 12.8 oz)   LMP 04/02/2022   SpO2 97%   BMI 37.46 kg/m    Body mass index is 37.46 kg/m .  Physical Exam   GENERAL: healthy, alert and no distress  NECK: no adenopathy, no asymmetry, masses, or scars and thyroid normal to palpation  RESP: lungs clear to auscultation - no rales, rhonchi or wheezes  CV: regular rate and rhythm, normal S1 S2, no S3 or S4, no murmur, click or rub, no peripheral edema and peripheral pulses strong  PSYCH: mentation appears normal, affect normal/bright

## 2022-04-06 ASSESSMENT — PATIENT HEALTH QUESTIONNAIRE - PHQ9: SUM OF ALL RESPONSES TO PHQ QUESTIONS 1-9: 2

## 2022-04-06 ASSESSMENT — ANXIETY QUESTIONNAIRES: GAD7 TOTAL SCORE: 7

## 2022-04-11 LAB
BKR LAB AP GYN ADEQUACY: NORMAL
BKR LAB AP GYN INTERPRETATION: NORMAL
BKR LAB AP HPV REFLEX: NORMAL
BKR LAB AP LMP: NORMAL
BKR LAB AP PREVIOUS ABNL DX: NORMAL
BKR LAB AP PREVIOUS ABNORMAL: NORMAL
PATH REPORT.COMMENTS IMP SPEC: NORMAL
PATH REPORT.COMMENTS IMP SPEC: NORMAL
PATH REPORT.RELEVANT HX SPEC: NORMAL

## 2022-04-12 PROBLEM — R87.615 UNSATISFACTORY CERVICAL PAPANICOLAOU SMEAR: Status: RESOLVED | Noted: 2022-02-17 | Resolved: 2022-04-12

## 2022-09-24 ENCOUNTER — HEALTH MAINTENANCE LETTER (OUTPATIENT)
Age: 37
End: 2022-09-24

## 2022-10-25 DIAGNOSIS — E88.819 INSULIN RESISTANCE: ICD-10-CM

## 2022-10-25 DIAGNOSIS — E28.2 POLYCYSTIC OVARY SYNDROME: ICD-10-CM

## 2022-10-25 DIAGNOSIS — O92.70 LACTATION PROBLEM: ICD-10-CM

## 2022-10-26 RX ORDER — METFORMIN HCL 500 MG
TABLET, EXTENDED RELEASE 24 HR ORAL
Qty: 180 TABLET | Refills: 1 | Status: SHIPPED | OUTPATIENT
Start: 2022-10-26 | End: 2023-02-07

## 2022-10-26 NOTE — TELEPHONE ENCOUNTER
"Outpatient Medication Detail     Disp Refills Start End TRINIDAD   metFORMIN (GLUCOPHAGE) 500 MG tablet     --   Sig - Route: Take 500 mg by mouth 2 times daily (with meals) - Oral   Class: Historical   Order: [id number]     Routing refill request to provider for review/approval because:  Medication is reported/historical    Last office visit provider:  4/5/22     Requested Prescriptions   Pending Prescriptions Disp Refills     metFORMIN (GLUCOPHAGE XR) 500 MG 24 hr tablet [Pharmacy Med Name: METFORMIN ER 500MG 24HR TABS] 180 tablet 1     Sig: TAKE 1 TABLET(500 MG) BY MOUTH TWICE DAILY WITH MEALS       Biguanide Agents Passed - 10/25/2022 10:16 AM        Passed - Patient is age 10 or older        Passed - Recent (12 mo) or future (30 days) visit within the authorizing provider's specialty      Patient has had an office visit with the authorizing provider or a provider within the authorizing providers department within the previous 12 mos or has a future within next 30 days. See \"Patient Info\" tab in inbasket, or \"Choose Columns\" in Meds & Orders section of the refill encounter.              Passed - Patient does NOT have a diagnosis of CHF.        Passed - Medication is active on med list        Passed - Patient is not pregnant        Passed - Patient has not had a positive pregnancy test within the past 12 mos.              Jay Ellis RN 10/26/22 1:33 PM  "

## 2023-01-20 ASSESSMENT — ANXIETY QUESTIONNAIRES
GAD7 TOTAL SCORE: 6
2. NOT BEING ABLE TO STOP OR CONTROL WORRYING: SEVERAL DAYS
6. BECOMING EASILY ANNOYED OR IRRITABLE: MORE THAN HALF THE DAYS
1. FEELING NERVOUS, ANXIOUS, OR ON EDGE: SEVERAL DAYS
7. FEELING AFRAID AS IF SOMETHING AWFUL MIGHT HAPPEN: NOT AT ALL
4. TROUBLE RELAXING: SEVERAL DAYS
IF YOU CHECKED OFF ANY PROBLEMS ON THIS QUESTIONNAIRE, HOW DIFFICULT HAVE THESE PROBLEMS MADE IT FOR YOU TO DO YOUR WORK, TAKE CARE OF THINGS AT HOME, OR GET ALONG WITH OTHER PEOPLE: SOMEWHAT DIFFICULT
5. BEING SO RESTLESS THAT IT IS HARD TO SIT STILL: NOT AT ALL
3. WORRYING TOO MUCH ABOUT DIFFERENT THINGS: SEVERAL DAYS

## 2023-01-20 ASSESSMENT — PATIENT HEALTH QUESTIONNAIRE - PHQ9: SUM OF ALL RESPONSES TO PHQ QUESTIONS 1-9: 4

## 2023-02-06 ASSESSMENT — ANXIETY QUESTIONNAIRES
IF YOU CHECKED OFF ANY PROBLEMS ON THIS QUESTIONNAIRE, HOW DIFFICULT HAVE THESE PROBLEMS MADE IT FOR YOU TO DO YOUR WORK, TAKE CARE OF THINGS AT HOME, OR GET ALONG WITH OTHER PEOPLE: SOMEWHAT DIFFICULT
8. IF YOU CHECKED OFF ANY PROBLEMS, HOW DIFFICULT HAVE THESE MADE IT FOR YOU TO DO YOUR WORK, TAKE CARE OF THINGS AT HOME, OR GET ALONG WITH OTHER PEOPLE?: SOMEWHAT DIFFICULT
5. BEING SO RESTLESS THAT IT IS HARD TO SIT STILL: NOT AT ALL
2. NOT BEING ABLE TO STOP OR CONTROL WORRYING: SEVERAL DAYS
GAD7 TOTAL SCORE: 6
1. FEELING NERVOUS, ANXIOUS, OR ON EDGE: SEVERAL DAYS
3. WORRYING TOO MUCH ABOUT DIFFERENT THINGS: SEVERAL DAYS
7. FEELING AFRAID AS IF SOMETHING AWFUL MIGHT HAPPEN: NOT AT ALL
GAD7 TOTAL SCORE: 6
GAD7 TOTAL SCORE: 6
6. BECOMING EASILY ANNOYED OR IRRITABLE: MORE THAN HALF THE DAYS
7. FEELING AFRAID AS IF SOMETHING AWFUL MIGHT HAPPEN: NOT AT ALL
4. TROUBLE RELAXING: SEVERAL DAYS

## 2023-02-06 ASSESSMENT — PATIENT HEALTH QUESTIONNAIRE - PHQ9
SUM OF ALL RESPONSES TO PHQ QUESTIONS 1-9: 4
10. IF YOU CHECKED OFF ANY PROBLEMS, HOW DIFFICULT HAVE THESE PROBLEMS MADE IT FOR YOU TO DO YOUR WORK, TAKE CARE OF THINGS AT HOME, OR GET ALONG WITH OTHER PEOPLE: SOMEWHAT DIFFICULT
SUM OF ALL RESPONSES TO PHQ QUESTIONS 1-9: 4

## 2023-02-07 ENCOUNTER — VIRTUAL VISIT (OUTPATIENT)
Dept: FAMILY MEDICINE | Facility: CLINIC | Age: 38
End: 2023-02-07
Payer: COMMERCIAL

## 2023-02-07 DIAGNOSIS — E66.01 MORBID OBESITY (H): ICD-10-CM

## 2023-02-07 DIAGNOSIS — I10 ESSENTIAL HYPERTENSION: ICD-10-CM

## 2023-02-07 DIAGNOSIS — E11.9 TYPE 2 DIABETES MELLITUS WITHOUT COMPLICATION, WITHOUT LONG-TERM CURRENT USE OF INSULIN (H): ICD-10-CM

## 2023-02-07 DIAGNOSIS — F33.0 MILD EPISODE OF RECURRENT MAJOR DEPRESSIVE DISORDER (H): ICD-10-CM

## 2023-02-07 DIAGNOSIS — F41.9 ANXIETY: Primary | ICD-10-CM

## 2023-02-07 PROCEDURE — 99214 OFFICE O/P EST MOD 30 MIN: CPT | Mod: 95 | Performed by: NURSE PRACTITIONER

## 2023-02-07 RX ORDER — SERTRALINE HYDROCHLORIDE 100 MG/1
150 TABLET, FILM COATED ORAL DAILY
Qty: 135 TABLET | Refills: 1 | Status: SHIPPED | OUTPATIENT
Start: 2023-02-07 | End: 2023-06-01

## 2023-02-07 ASSESSMENT — PATIENT HEALTH QUESTIONNAIRE - PHQ9
SUM OF ALL RESPONSES TO PHQ QUESTIONS 1-9: 4
10. IF YOU CHECKED OFF ANY PROBLEMS, HOW DIFFICULT HAVE THESE PROBLEMS MADE IT FOR YOU TO DO YOUR WORK, TAKE CARE OF THINGS AT HOME, OR GET ALONG WITH OTHER PEOPLE: SOMEWHAT DIFFICULT

## 2023-02-07 ASSESSMENT — ANXIETY QUESTIONNAIRES: GAD7 TOTAL SCORE: 6

## 2023-02-07 NOTE — PROGRESS NOTES
Edel is a 37 year old who is being evaluated via a billable video visit.      How would you like to obtain your AVS? MyChart  If the video visit is dropped, the invitation should be resent by: Text to cell phone: [phone]  Will anyone else be joining your video visit? No          Anxiety  Mild episode of recurrent major depressive disorder (H)  Patient states her anxiety and depression are well controlled.  She continues sertraline.  She is seeing a therapist.  - sertraline (ZOLOFT) 100 MG tablet  Dispense: 135 tablet; Refill: 1    Type 2 diabetes mellitus without complication, without long-term current use of insulin (H)  Patient was diagnosed with diabetes in 2014.  We will check A1c.  She continues Mounjaro.  She is to follow-up for lab only appointment.  Recommend recheck in 6 months.  - Hemoglobin A1c  - Comprehensive metabolic panel (BMP + Alb, Alk Phos, ALT, AST, Total. Bili, TP)  - Lipid panel reflex to direct LDL Fasting  - Albumin Random Urine Quantitative with Creat Ratio    Essential hypertension  This is controlled per at home readings.  She is not currently taking an antihypertensive.    Morbid obesity (H)  Recommend consuming a healthy diet and exercising.  This is contributing to diabetes and hypertension.  I congratulated her on her weight loss efforts.        Subjective   Edel is a 37 year old presenting for the following health issues:  Establish Care (Refills )  Presenting to the clinic for medication management.  Patient was diagnosed with type 2 diabetes in 2014 with an A1c of 7.5%.  She has been seeing [facility] where she receives Mounjaro.  She is injecting 5 mg one weekly.  Last A1c was 6.2% on 2/17/2022.  Patient had weight gain after having her daughter August 2021.  She had fluctuating blood sugars at that time.  She has lost 35 pounds.  She has a history of hypertension and was taking labetalol.  As she lost weight, she developed dizziness.  She has discontinued the labetalol.   "Last blood pressure was 120/82.  She has a history of anxiety and depression.  She is taking sertraline 150 mg daily.  She feels as though her mood is stable.  She denies thoughts of suicide.  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  When she is anxious, she feels overwhelmed and obsesses about a schedule.  She is seeing a therapist.  She denies thoughts of suicide.    History of Present Illness       Mental Health Follow-up:  Patient presents to follow-up on Depression & Anxiety.Patient's depression since last visit has been:  No change  The patient is not having other symptoms associated with depression.  Patient's anxiety since last visit has been:  No change  The patient is not having other symptoms associated with anxiety.  Any significant life events: No  Patient is not feeling anxious or having panic attacks.  Patient has no concerns about alcohol or drug use.    She eats 2-3 servings of fruits and vegetables daily.She consumes 0 sweetened beverage(s) daily.She exercises with enough effort to increase her heart rate 30 to 60 minutes per day.  She exercises with enough effort to increase her heart rate 4 days per week.   She is taking medications regularly.    Today's PHQ-9         PHQ-9 Total Score: 4    PHQ-9 Q9 Thoughts of better off dead/self-harm past 2 weeks :   Not at all    How difficult have these problems made it for you to do your work, take care of things at home, or get along with other people: Somewhat difficult  Today's MECHELLE-7 Score: 6         Review of Systems   Constitutional, HEENT, cardiovascular, pulmonary, gi and gu systems are negative, except as otherwise noted.      Objective    Vitals - Patient Reported  Systolic (Patient Reported): 120  Diastolic (Patient Reported): 82  Weight (Patient Reported): 77.1 kg (170 lb)  Height (Patient Reported): 154.9 cm (5' 1\")  BMI (Based on Pt Reported Ht/Wt): 32.12  Temperature (Patient Reported): 98.7  F (37.1 "  C)        Physical Exam   GENERAL: Healthy, alert and no distress  EYES: Eyes grossly normal to inspection.  No discharge or erythema, or obvious scleral/conjunctival abnormalities.  HENT: Normal cephalic/atraumatic.  External ears, nose and mouth without ulcers or lesions.  No nasal drainage visible.  NECK: No asymmetry, visible masses or scars  RESP: No audible wheeze, cough, or visible cyanosis.  No visible retractions or increased work of breathing.    MS: No gross musculoskeletal defects noted.  Normal range of motion.  No visible edema.  SKIN: Visible skin clear. No significant rash, abnormal pigmentation or lesions.  NEURO: Cranial nerves grossly intact.  Mentation and speech appropriate for age.  PSYCH: Mentation appears normal, affect normal/bright, judgement and insight intact, normal speech and appearance well-groomed.          Video-Visit Details    Type of service:  Video Visit     Originating Location (pt. Location): Home    Distant Location (provider location):  On-site  Platform used for Video Visit: Marge

## 2023-02-20 ENCOUNTER — LAB (OUTPATIENT)
Dept: LAB | Facility: CLINIC | Age: 38
End: 2023-02-20
Payer: COMMERCIAL

## 2023-02-20 DIAGNOSIS — E11.9 TYPE 2 DIABETES MELLITUS WITHOUT COMPLICATION, WITHOUT LONG-TERM CURRENT USE OF INSULIN (H): ICD-10-CM

## 2023-02-20 LAB
ALBUMIN SERPL BCG-MCNC: 4.3 G/DL (ref 3.5–5.2)
ALP SERPL-CCNC: 73 U/L (ref 35–104)
ALT SERPL W P-5'-P-CCNC: 6 U/L (ref 10–35)
ANION GAP SERPL CALCULATED.3IONS-SCNC: 13 MMOL/L (ref 7–15)
AST SERPL W P-5'-P-CCNC: 13 U/L (ref 10–35)
BILIRUB SERPL-MCNC: 0.6 MG/DL
BUN SERPL-MCNC: 8.6 MG/DL (ref 6–20)
CALCIUM SERPL-MCNC: 9.5 MG/DL (ref 8.6–10)
CHLORIDE SERPL-SCNC: 105 MMOL/L (ref 98–107)
CHOLEST SERPL-MCNC: 183 MG/DL
CREAT SERPL-MCNC: 0.89 MG/DL (ref 0.51–0.95)
CREAT UR-MCNC: 118 MG/DL
DEPRECATED HCO3 PLAS-SCNC: 20 MMOL/L (ref 22–29)
GFR SERPL CREATININE-BSD FRML MDRD: 86 ML/MIN/1.73M2
GLUCOSE SERPL-MCNC: 99 MG/DL (ref 70–99)
HBA1C MFR BLD: 5.6 % (ref 0–5.6)
HDLC SERPL-MCNC: 53 MG/DL
LDLC SERPL CALC-MCNC: 117 MG/DL
MICROALBUMIN UR-MCNC: 14.5 MG/L
MICROALBUMIN/CREAT UR: 12.29 MG/G CR (ref 0–25)
NONHDLC SERPL-MCNC: 130 MG/DL
POTASSIUM SERPL-SCNC: 4.2 MMOL/L (ref 3.4–5.3)
PROT SERPL-MCNC: 7.5 G/DL (ref 6.4–8.3)
SODIUM SERPL-SCNC: 138 MMOL/L (ref 136–145)
TRIGL SERPL-MCNC: 65 MG/DL

## 2023-02-20 PROCEDURE — 36415 COLL VENOUS BLD VENIPUNCTURE: CPT

## 2023-02-20 PROCEDURE — 82043 UR ALBUMIN QUANTITATIVE: CPT

## 2023-02-20 PROCEDURE — 83036 HEMOGLOBIN GLYCOSYLATED A1C: CPT

## 2023-02-20 PROCEDURE — 80053 COMPREHEN METABOLIC PANEL: CPT

## 2023-02-20 PROCEDURE — 82570 ASSAY OF URINE CREATININE: CPT

## 2023-02-20 PROCEDURE — 80061 LIPID PANEL: CPT

## 2023-02-28 DIAGNOSIS — E11.9 TYPE 2 DIABETES MELLITUS WITHOUT COMPLICATION, WITHOUT LONG-TERM CURRENT USE OF INSULIN (H): Primary | ICD-10-CM

## 2023-02-28 RX ORDER — TIRZEPATIDE 7.5 MG/.5ML
7.5 INJECTION, SOLUTION SUBCUTANEOUS
Qty: 2 ML | Refills: 3 | Status: SHIPPED | OUTPATIENT
Start: 2023-02-28 | End: 2023-04-13

## 2023-04-05 NOTE — PROGRESS NOTES
Please notify the patient of this and see if she wants the prescription sent to another pharmacy.  Thanks.

## 2023-04-05 NOTE — PROGRESS NOTES
Pharmacy calls to report that this medication is on back order. Nava in [place] requesting an alternative or next steps.     Please advise.

## 2023-04-12 DIAGNOSIS — E11.9 TYPE 2 DIABETES MELLITUS WITHOUT COMPLICATION, WITHOUT LONG-TERM CURRENT USE OF INSULIN (H): ICD-10-CM

## 2023-04-13 RX ORDER — TIRZEPATIDE 7.5 MG/.5ML
7.5 INJECTION, SOLUTION SUBCUTANEOUS
Qty: 2 ML | Refills: 3 | Status: SHIPPED | OUTPATIENT
Start: 2023-04-13 | End: 2023-04-18

## 2023-04-18 ENCOUNTER — MYC MEDICAL ADVICE (OUTPATIENT)
Dept: FAMILY MEDICINE | Facility: CLINIC | Age: 38
End: 2023-04-18
Payer: COMMERCIAL

## 2023-04-18 DIAGNOSIS — E11.9 TYPE 2 DIABETES MELLITUS WITHOUT COMPLICATION, WITHOUT LONG-TERM CURRENT USE OF INSULIN (H): Primary | ICD-10-CM

## 2023-04-18 DIAGNOSIS — E11.9 TYPE 2 DIABETES MELLITUS WITHOUT COMPLICATION, WITHOUT LONG-TERM CURRENT USE OF INSULIN (H): ICD-10-CM

## 2023-04-18 RX ORDER — TIRZEPATIDE 5 MG/.5ML
5 INJECTION, SOLUTION SUBCUTANEOUS
Qty: 2 ML | Refills: 1 | Status: SHIPPED | OUTPATIENT
Start: 2023-04-18 | End: 2023-04-27

## 2023-04-27 RX ORDER — TIRZEPATIDE 5 MG/.5ML
5 INJECTION, SOLUTION SUBCUTANEOUS
Qty: 2 ML | Refills: 1 | Status: SHIPPED | OUTPATIENT
Start: 2023-04-27 | End: 2023-05-30

## 2023-05-08 ENCOUNTER — HEALTH MAINTENANCE LETTER (OUTPATIENT)
Age: 38
End: 2023-05-08

## 2023-05-10 ENCOUNTER — MYC MEDICAL ADVICE (OUTPATIENT)
Dept: FAMILY MEDICINE | Facility: CLINIC | Age: 38
End: 2023-05-10
Payer: COMMERCIAL

## 2023-05-18 NOTE — TELEPHONE ENCOUNTER
Patient Quality Outreach    Patient is due for the following:   Diabetes -  Eye Exam    Next Steps:   Patient has upcoming appointment, these items will be addressed at that time.  Patient reported eye exam on 5/10/23.     Type of outreach:    Sent [employer] message.      Questions for provider review:    None           Mila Levin RN  Chart routed to n/a.

## 2023-05-30 ENCOUNTER — VIRTUAL VISIT (OUTPATIENT)
Dept: FAMILY MEDICINE | Facility: CLINIC | Age: 38
End: 2023-05-30
Payer: COMMERCIAL

## 2023-05-30 DIAGNOSIS — E11.9 TYPE 2 DIABETES MELLITUS WITHOUT COMPLICATION, WITHOUT LONG-TERM CURRENT USE OF INSULIN (H): Primary | ICD-10-CM

## 2023-05-30 DIAGNOSIS — F33.0 MILD EPISODE OF RECURRENT MAJOR DEPRESSIVE DISORDER (H): ICD-10-CM

## 2023-05-30 DIAGNOSIS — F41.9 ANXIETY: ICD-10-CM

## 2023-05-30 DIAGNOSIS — N92.1 MENORRHAGIA WITH IRREGULAR CYCLE: ICD-10-CM

## 2023-05-30 PROCEDURE — 99214 OFFICE O/P EST MOD 30 MIN: CPT | Mod: VID | Performed by: NURSE PRACTITIONER

## 2023-05-30 RX ORDER — TIRZEPATIDE 7.5 MG/.5ML
INJECTION, SOLUTION SUBCUTANEOUS
COMMUNITY
Start: 2023-04-18 | End: 2023-06-01

## 2023-05-30 NOTE — PROGRESS NOTES
Edel is a 37 year old who is being evaluated via a billable video visit.      How would you like to obtain your AVS? MyChart  If the video visit is dropped, the invitation should be resent by: Text to cell phone: [phone]  Will anyone else be joining your video visit? No          Type 2 diabetes mellitus without complication, without long-term current use of insulin (H)  This is controlled. Discussed treatment options.  We will increase Mounjaro to 10 mg once weekly.  Educated on its indications and side effects. She is to follow-up in six months.    - tirzepatide (MOUNJARO) 10 MG/0.5ML pen  Dispense: 2 mL; Refill: 0    Menorrhagia with irregular cycle  We will rule out thyroid disease and hyperprolactinemia.  Will obtain pelvic ultrasound to rule out endometrial hyperplasia and uterine fibroids.  Further plans pending the results.  Patient may also try ibuprofen 600 mg 3 times daily for 5 days to assist with the bleeding.  IUD is likely contributing.  - TSH with free T4 reflex  - Prolactin  - US Pelvic Complete with Transvaginal    Anxiety  Mild episode of recurrent major depressive disorder (H)  Mood is stable.  She continues sertraline.  - sertraline (ZOLOFT) 100 MG tablet  Dispense: 135 tablet; Refill: 1        Subjective   Edel is a 37 year old, presenting for the following health issues:  No chief complaint on file.  Patient presents today for medication management.  Patient has type 2 diabetes and is injecting Mounjaro 7.5 mg once weekly.  She would like to increase the dose to 10 mg daily.  She has lost 40 to 45 pounds since October.  She does not check her blood sugars.  She walks for exercise.  She is consuming a healthy diet.  Patient is taking sertraline 150 mg daily for anxiety and depression.  She feels as though her mood is stable.  She denies thoughts of suicide.  She feels well supported.  Patient states since starting the Mounjaro in October, her periods have been more frequent occurring  every 2 weeks.  Bleeding last for 2 weeks.  She feels as though she has vaginal bleeding for most of the month.  She does have the ParaGard IUD.          4/5/2022     2:28 PM   Additional Questions   Roomed by Janet Gandara CMA     History of Present Illness       Diabetes:   She presents for follow up of diabetes.  She is checking home blood glucose one time daily. She checks blood glucose before meals.  Blood glucose is never over 200 and never under 70. When her blood glucose is low, the patient is asymptomatic for confusion, blurred vision, lethargy and reports not feeling dizzy, shaky, or weak.  She has no concerns regarding her diabetes at this time.  She is having weight loss.         She eats 2-3 servings of fruits and vegetables daily.She consumes 0 sweetened beverage(s) daily.She exercises with enough effort to increase her heart rate 30 to 60 minutes per day.  She exercises with enough effort to increase her heart rate 4 days per week.   She is taking medications regularly.         Review of Systems   Constitutional, HEENT, cardiovascular, pulmonary, gi and gu systems are negative, except as otherwise noted.      Objective           Vitals:  No vitals were obtained today due to virtual visit.    Physical Exam   GENERAL: Healthy, alert and no distress  EYES: Eyes grossly normal to inspection.  No discharge or erythema, or obvious scleral/conjunctival abnormalities.  HENT: Normal cephalic/atraumatic.  External ears, nose and mouth without ulcers or lesions.  No nasal drainage visible.  NECK: No asymmetry, visible masses or scars  RESP: No audible wheeze, cough, or visible cyanosis.  No visible retractions or increased work of breathing.    MS: No gross musculoskeletal defects noted.  Normal range of motion.  No visible edema.  SKIN: Visible skin clear. No significant rash, abnormal pigmentation or lesions.  NEURO: Cranial nerves grossly intact.  Mentation and speech appropriate for age.  PSYCH: Mentation  appears normal, affect normal/bright, judgement and insight intact, normal speech and appearance well-groomed.      Video-Visit Details    Type of service:  Video Visit     Originating Location (pt. Location): Home    Distant Location (provider location):  On-site  Platform used for Video Visit: Marge

## 2023-06-01 RX ORDER — TIRZEPATIDE 10 MG/.5ML
10 INJECTION, SOLUTION SUBCUTANEOUS
Qty: 2 ML | Refills: 0 | Status: SHIPPED | OUTPATIENT
Start: 2023-06-01 | End: 2023-07-02

## 2023-06-01 RX ORDER — SERTRALINE HYDROCHLORIDE 100 MG/1
150 TABLET, FILM COATED ORAL DAILY
Qty: 135 TABLET | Refills: 1 | Status: SHIPPED | OUTPATIENT
Start: 2023-06-01 | End: 2024-02-15

## 2023-06-04 ENCOUNTER — HEALTH MAINTENANCE LETTER (OUTPATIENT)
Age: 38
End: 2023-06-04

## 2023-06-16 DIAGNOSIS — E11.9 TYPE 2 DIABETES MELLITUS WITHOUT COMPLICATION, WITHOUT LONG-TERM CURRENT USE OF INSULIN (H): ICD-10-CM

## 2023-06-16 RX ORDER — TIRZEPATIDE 5 MG/.5ML
INJECTION, SOLUTION SUBCUTANEOUS
Qty: 4 ML | Refills: 5 | OUTPATIENT
Start: 2023-06-16

## 2023-06-16 NOTE — TELEPHONE ENCOUNTER
Refusing, as this medication was discontinued by provider.    Kellee Hernandez RN on 6/16/2023 at 3:45 PM

## 2023-06-17 RX ORDER — TIRZEPATIDE 5 MG/.5ML
INJECTION, SOLUTION SUBCUTANEOUS
Qty: 4 ML | Refills: 5 | OUTPATIENT
Start: 2023-06-17

## 2023-06-17 NOTE — TELEPHONE ENCOUNTER
Outpatient Medication Detail     Disp Refills Start End TRINIDAD   tirzepatide (MOUNJARO) 5 MG/0.5ML pen (Discontinued) 2 mL 1 4/27/2023 5/30/2023 No   Sig - Route: Inject 5 mg Subcutaneous every 7 days - Subcutaneous   Sent to pharmacy as: Mounjaro 5 MG/0.5ML Subcutaneous Solution Pen-injector (tirzepatide)   Class: E-Prescribe

## 2023-06-30 DIAGNOSIS — E11.9 TYPE 2 DIABETES MELLITUS WITHOUT COMPLICATION, WITHOUT LONG-TERM CURRENT USE OF INSULIN (H): ICD-10-CM

## 2023-06-30 NOTE — TELEPHONE ENCOUNTER
Routing refill request to provider for review/approval because:  Drug not on the [facility] refill protocol     Last Written Prescription Date:  6/1/2023  Last Fill Quantity: 20ml,  # refills: 0   Last office visit provider:  5/30/2023     Requested Prescriptions   Pending Prescriptions Disp Refills     MOUNJARO 10 MG/0.5ML pen [Pharmacy Med Name: MOUNJARO 10MG/0.5ML PF PEN INJ] 2 mL 0     Sig: ADMINISTER 10 MG UNDER THE SKIN EVERY 7 DAYS       There is no refill protocol information for this order          Janel Marinelli RN 06/30/23 1:57 PM

## 2023-06-30 NOTE — TELEPHONE ENCOUNTER
Former patient of Marguerite & has not established care with another provider.  Please assign refill request to covering provider per clinic standard process.  -last two VV have been with ARYAN Starr, if patient has established care with them please update in epic    Routing refill request to provider for review/approval because:  Drug not on the [facility] [place]     Last Written Prescription Date:  6/1/23  Last Fill Quantity: 135,  # refills: 1   Last office visit provider:   5/30/23 VV with uziel    Requested Prescriptions   Pending Prescriptions Disp Refills     MOUNJARO 10 MG/0.5ML pen [Pharmacy Med Name: MOUNJARO 10MG/0.5ML PF PEN INJ] 2 mL 0     Sig: ADMINISTER 10 MG UNDER THE SKIN EVERY 7 DAYS       There is no refill protocol information for this order          CROW PRITCHETT RN 06/30/23 2:11 PM

## 2023-06-30 NOTE — TELEPHONE ENCOUNTER
Pt has future appointment with Michelle Starr at [place]. Routing to Jose Toth who is covering for Velvet Everett. If he will not fill please route to Michelle Starr.     Steffanie Bartholomew CMA.

## 2023-07-02 RX ORDER — TIRZEPATIDE 10 MG/.5ML
INJECTION, SOLUTION SUBCUTANEOUS
Qty: 2 ML | Refills: 0 | Status: SHIPPED | OUTPATIENT
Start: 2023-07-02 | End: 2023-08-09

## 2023-07-03 RX ORDER — TIRZEPATIDE 10 MG/.5ML
INJECTION, SOLUTION SUBCUTANEOUS
Qty: 2 ML | Refills: 0 | Status: SHIPPED | OUTPATIENT
Start: 2023-07-03 | End: 2023-08-09

## 2023-08-04 ENCOUNTER — LAB (OUTPATIENT)
Dept: LAB | Facility: CLINIC | Age: 38
End: 2023-08-04
Payer: COMMERCIAL

## 2023-08-04 DIAGNOSIS — N92.1 MENORRHAGIA WITH IRREGULAR CYCLE: ICD-10-CM

## 2023-08-04 LAB
PROLACTIN SERPL 3RD IS-MCNC: 12 NG/ML (ref 5–23)
TSH SERPL DL<=0.005 MIU/L-ACNC: 1.35 UIU/ML (ref 0.3–4.2)

## 2023-08-04 PROCEDURE — 84146 ASSAY OF PROLACTIN: CPT

## 2023-08-04 PROCEDURE — 36415 COLL VENOUS BLD VENIPUNCTURE: CPT

## 2023-08-04 PROCEDURE — 84443 ASSAY THYROID STIM HORMONE: CPT

## 2023-08-09 DIAGNOSIS — E66.01 MORBID OBESITY (H): Primary | ICD-10-CM

## 2023-08-09 RX ORDER — TIRZEPATIDE 12.5 MG/.5ML
12.5 INJECTION, SOLUTION SUBCUTANEOUS
Qty: 2 ML | Refills: 0 | Status: SHIPPED | OUTPATIENT
Start: 2023-08-09 | End: 2023-09-25

## 2023-09-24 DIAGNOSIS — E66.01 MORBID OBESITY (H): ICD-10-CM

## 2023-09-25 DIAGNOSIS — E66.01 MORBID OBESITY (H): ICD-10-CM

## 2023-09-25 RX ORDER — TIRZEPATIDE 12.5 MG/.5ML
INJECTION, SOLUTION SUBCUTANEOUS
Qty: 2 ML | Refills: 0 | Status: SHIPPED | OUTPATIENT
Start: 2023-09-25 | End: 2023-11-16

## 2023-10-14 ENCOUNTER — HEALTH MAINTENANCE LETTER (OUTPATIENT)
Age: 38
End: 2023-10-14

## 2023-11-10 ENCOUNTER — HOSPITAL ENCOUNTER (OUTPATIENT)
Dept: ULTRASOUND IMAGING | Facility: CLINIC | Age: 38
Discharge: HOME | End: 2023-11-10
Attending: NURSE PRACTITIONER | Admitting: NURSE PRACTITIONER
Payer: COMMERCIAL

## 2023-11-10 ENCOUNTER — TELEPHONE (OUTPATIENT)
Dept: FAMILY MEDICINE | Facility: CLINIC | Age: 38
End: 2023-11-10
Payer: COMMERCIAL

## 2023-11-10 DIAGNOSIS — T83.32XA MALPOSITIONED INTRAUTERINE DEVICE (IUD), INITIAL ENCOUNTER: Primary | ICD-10-CM

## 2023-11-10 DIAGNOSIS — N92.1 MENORRHAGIA WITH IRREGULAR CYCLE: ICD-10-CM

## 2023-11-10 LAB — RADIOLOGIST FLAGS: ABNORMAL

## 2023-11-10 PROCEDURE — 76830 TRANSVAGINAL US NON-OB: CPT

## 2023-11-10 NOTE — TELEPHONE ENCOUNTER
"  Test Results    Contacts         Type Contact Phone/Fax    11/10/2023 02:20 PM CST Phone (Incoming) Nancy, Imaging [phone]            Who ordered the test:  NATALY Mcmanus CNP    Type of test: Lab and Ultrasound    Date of test:  11/10/23    Where was the test performed:  [facility] Imaging Department    What are your questions/concerns?:   Nancy with Imaging Dept calling to report an urgent/abnormal finding on Pelivc Ultrasound done today:    \"IMPRESSION:  1.  Malpositioned IUD in the lower uterine segment. The IUD appears at least partially embedded within the myometrium.\"    Could we [facility] this information to you [facility] or would you prefer to receive a phone call?:   Patient would prefer a phone call   Okay to leave a detailed message?: No at Other phone number:  [phone] for Radiology Dept.    CLEMENTINE MezaN, RN  [facility]    "

## 2023-11-10 NOTE — TELEPHONE ENCOUNTER
I called and spoke with patient.  I referred to [facility] in [place] for treatment.  Thanks.

## 2023-11-16 DIAGNOSIS — E11.9 TYPE 2 DIABETES MELLITUS WITHOUT COMPLICATION, WITHOUT LONG-TERM CURRENT USE OF INSULIN (H): Primary | ICD-10-CM

## 2023-11-16 DIAGNOSIS — E66.01 MORBID OBESITY (H): ICD-10-CM

## 2023-12-05 ENCOUNTER — TRANSFERRED RECORDS (OUTPATIENT)
Dept: HEALTH INFORMATION MANAGEMENT | Facility: CLINIC | Age: 38
End: 2023-12-05
Payer: COMMERCIAL

## 2023-12-05 LAB — PHQ9 SCORE: 2

## 2024-01-04 ASSESSMENT — ENCOUNTER SYMPTOMS
DYSURIA: 0
PALPITATIONS: 0
WEAKNESS: 0
SORE THROAT: 0
BREAST MASS: 0
NERVOUS/ANXIOUS: 0
DIZZINESS: 0
CONSTIPATION: 0
NAUSEA: 0
JOINT SWELLING: 0
FREQUENCY: 0
HEMATURIA: 0
CHILLS: 0
MYALGIAS: 0
ARTHRALGIAS: 0
HEARTBURN: 0
PARESTHESIAS: 0
HEADACHES: 0
SHORTNESS OF BREATH: 0
DIARRHEA: 0
COUGH: 0
FEVER: 0
HEMATOCHEZIA: 0
EYE PAIN: 0
ABDOMINAL PAIN: 0

## 2024-01-04 ASSESSMENT — PATIENT HEALTH QUESTIONNAIRE - PHQ9
10. IF YOU CHECKED OFF ANY PROBLEMS, HOW DIFFICULT HAVE THESE PROBLEMS MADE IT FOR YOU TO DO YOUR WORK, TAKE CARE OF THINGS AT HOME, OR GET ALONG WITH OTHER PEOPLE: NOT DIFFICULT AT ALL
SUM OF ALL RESPONSES TO PHQ QUESTIONS 1-9: 4
SUM OF ALL RESPONSES TO PHQ QUESTIONS 1-9: 4

## 2024-01-10 ENCOUNTER — ANESTHESIA EVENT (OUTPATIENT)
Dept: SURGERY | Facility: AMBULATORY SURGERY CENTER | Age: 39
End: 2024-01-10
Payer: COMMERCIAL

## 2024-01-11 ENCOUNTER — ANESTHESIA (OUTPATIENT)
Dept: SURGERY | Facility: AMBULATORY SURGERY CENTER | Age: 39
End: 2024-01-11
Payer: COMMERCIAL

## 2024-01-11 ENCOUNTER — HOSPITAL ENCOUNTER (OUTPATIENT)
Facility: AMBULATORY SURGERY CENTER | Age: 39
Discharge: HOME | End: 2024-01-11
Attending: OBSTETRICS & GYNECOLOGY
Payer: COMMERCIAL

## 2024-01-11 VITALS
RESPIRATION RATE: 16 BRPM | HEIGHT: 61 IN | TEMPERATURE: 97.2 F | BODY MASS INDEX: 25.49 KG/M2 | SYSTOLIC BLOOD PRESSURE: 125 MMHG | HEART RATE: 70 BPM | DIASTOLIC BLOOD PRESSURE: 79 MMHG | OXYGEN SATURATION: 97 % | WEIGHT: 135 LBS

## 2024-01-11 DIAGNOSIS — T83.32XA MALPOSITIONED INTRAUTERINE DEVICE: ICD-10-CM

## 2024-01-11 DIAGNOSIS — Z30.09 UNWANTED FERTILITY: ICD-10-CM

## 2024-01-11 DIAGNOSIS — Z98.890 S/P LAPAROSCOPY: Primary | ICD-10-CM

## 2024-01-11 LAB
GLUCOSE SERPL-MCNC: 91 MG/DL (ref 70–99)
HCG UR QL: NEGATIVE
INTERNAL QC OK POCT: NORMAL
POCT KIT EXPIRATION DATE: NORMAL
POCT KIT LOT NUMBER: NORMAL

## 2024-01-11 RX ORDER — ONDANSETRON 4 MG/1
4 TABLET, ORALLY DISINTEGRATING ORAL EVERY 30 MIN PRN
Status: DISCONTINUED | OUTPATIENT
Start: 2024-01-11 | End: 2024-01-12 | Stop reason: HOSPADM

## 2024-01-11 RX ORDER — LABETALOL 20 MG/4 ML (5 MG/ML) INTRAVENOUS SYRINGE
10
Status: DISCONTINUED | OUTPATIENT
Start: 2024-01-11 | End: 2024-01-12 | Stop reason: HOSPADM

## 2024-01-11 RX ORDER — SODIUM CHLORIDE, SODIUM LACTATE, POTASSIUM CHLORIDE, CALCIUM CHLORIDE 600; 310; 30; 20 MG/100ML; MG/100ML; MG/100ML; MG/100ML
INJECTION, SOLUTION INTRAVENOUS CONTINUOUS
Status: DISCONTINUED | OUTPATIENT
Start: 2024-01-11 | End: 2024-01-12 | Stop reason: HOSPADM

## 2024-01-11 RX ORDER — LIDOCAINE HYDROCHLORIDE 20 MG/ML
INJECTION, SOLUTION INFILTRATION; PERINEURAL PRN
Status: DISCONTINUED | OUTPATIENT
Start: 2024-01-11 | End: 2024-01-11

## 2024-01-11 RX ORDER — IBUPROFEN 800 MG/1
800 TABLET, FILM COATED ORAL EVERY 6 HOURS PRN
Qty: 30 TABLET | Refills: 0 | Status: SHIPPED | OUTPATIENT
Start: 2024-01-11

## 2024-01-11 RX ORDER — HYDROMORPHONE HCL IN WATER/PF 6 MG/30 ML
0.2 PATIENT CONTROLLED ANALGESIA SYRINGE INTRAVENOUS EVERY 5 MIN PRN
Status: DISCONTINUED | OUTPATIENT
Start: 2024-01-11 | End: 2024-01-12 | Stop reason: HOSPADM

## 2024-01-11 RX ORDER — HYDROMORPHONE HCL IN WATER/PF 6 MG/30 ML
0.4 PATIENT CONTROLLED ANALGESIA SYRINGE INTRAVENOUS EVERY 5 MIN PRN
Status: DISCONTINUED | OUTPATIENT
Start: 2024-01-11 | End: 2024-01-12 | Stop reason: HOSPADM

## 2024-01-11 RX ORDER — DEXAMETHASONE SODIUM PHOSPHATE 4 MG/ML
INJECTION, SOLUTION INTRA-ARTICULAR; INTRALESIONAL; INTRAMUSCULAR; INTRAVENOUS; SOFT TISSUE PRN
Status: DISCONTINUED | OUTPATIENT
Start: 2024-01-11 | End: 2024-01-11

## 2024-01-11 RX ORDER — ONDANSETRON 2 MG/ML
4 INJECTION INTRAMUSCULAR; INTRAVENOUS EVERY 30 MIN PRN
Status: DISCONTINUED | OUTPATIENT
Start: 2024-01-11 | End: 2024-01-12 | Stop reason: HOSPADM

## 2024-01-11 RX ORDER — BUPIVACAINE HYDROCHLORIDE 2.5 MG/ML
INJECTION, SOLUTION INFILTRATION; PERINEURAL PRN
Status: DISCONTINUED | OUTPATIENT
Start: 2024-01-11 | End: 2024-01-11 | Stop reason: HOSPADM

## 2024-01-11 RX ORDER — FENTANYL CITRATE 50 UG/ML
INJECTION, SOLUTION INTRAMUSCULAR; INTRAVENOUS PRN
Status: DISCONTINUED | OUTPATIENT
Start: 2024-01-11 | End: 2024-01-11

## 2024-01-11 RX ORDER — FENTANYL CITRATE 0.05 MG/ML
25 INJECTION, SOLUTION INTRAMUSCULAR; INTRAVENOUS EVERY 5 MIN PRN
Status: DISCONTINUED | OUTPATIENT
Start: 2024-01-11 | End: 2024-01-12 | Stop reason: HOSPADM

## 2024-01-11 RX ORDER — IBUPROFEN 800 MG/1
800 TABLET, FILM COATED ORAL ONCE
Status: DISCONTINUED | OUTPATIENT
Start: 2024-01-11 | End: 2024-01-12 | Stop reason: HOSPADM

## 2024-01-11 RX ORDER — FENTANYL CITRATE 0.05 MG/ML
50 INJECTION, SOLUTION INTRAMUSCULAR; INTRAVENOUS EVERY 5 MIN PRN
Status: DISCONTINUED | OUTPATIENT
Start: 2024-01-11 | End: 2024-01-12 | Stop reason: HOSPADM

## 2024-01-11 RX ORDER — OXYCODONE HYDROCHLORIDE 5 MG/1
5 TABLET ORAL
Status: COMPLETED | OUTPATIENT
Start: 2024-01-11 | End: 2024-01-11

## 2024-01-11 RX ORDER — LIDOCAINE 40 MG/G
CREAM TOPICAL
Status: DISCONTINUED | OUTPATIENT
Start: 2024-01-11 | End: 2024-01-12 | Stop reason: HOSPADM

## 2024-01-11 RX ORDER — ACETAMINOPHEN 325 MG/1
975 TABLET ORAL ONCE
Status: DISCONTINUED | OUTPATIENT
Start: 2024-01-11 | End: 2024-01-12 | Stop reason: HOSPADM

## 2024-01-11 RX ORDER — PROPOFOL 10 MG/ML
INJECTION, EMULSION INTRAVENOUS CONTINUOUS PRN
Status: DISCONTINUED | OUTPATIENT
Start: 2024-01-11 | End: 2024-01-11

## 2024-01-11 RX ORDER — OXYCODONE HYDROCHLORIDE 5 MG/1
5 TABLET ORAL
Status: DISCONTINUED | OUTPATIENT
Start: 2024-01-11 | End: 2024-01-12 | Stop reason: HOSPADM

## 2024-01-11 RX ORDER — PROPOFOL 10 MG/ML
INJECTION, EMULSION INTRAVENOUS PRN
Status: DISCONTINUED | OUTPATIENT
Start: 2024-01-11 | End: 2024-01-11

## 2024-01-11 RX ORDER — DEXMEDETOMIDINE HYDROCHLORIDE 4 UG/ML
INJECTION, SOLUTION INTRAVENOUS PRN
Status: DISCONTINUED | OUTPATIENT
Start: 2024-01-11 | End: 2024-01-11

## 2024-01-11 RX ORDER — ONDANSETRON 2 MG/ML
INJECTION INTRAMUSCULAR; INTRAVENOUS PRN
Status: DISCONTINUED | OUTPATIENT
Start: 2024-01-11 | End: 2024-01-11

## 2024-01-11 RX ORDER — KETOROLAC TROMETHAMINE 15 MG/ML
INJECTION, SOLUTION INTRAMUSCULAR; INTRAVENOUS PRN
Status: DISCONTINUED | OUTPATIENT
Start: 2024-01-11 | End: 2024-01-11

## 2024-01-11 RX ORDER — FENTANYL CITRATE 0.05 MG/ML
50 INJECTION, SOLUTION INTRAMUSCULAR; INTRAVENOUS
Status: DISCONTINUED | OUTPATIENT
Start: 2024-01-11 | End: 2024-01-12 | Stop reason: HOSPADM

## 2024-01-11 RX ORDER — ACETAMINOPHEN 325 MG/1
975 TABLET ORAL ONCE
Status: COMPLETED | OUTPATIENT
Start: 2024-01-11 | End: 2024-01-11

## 2024-01-11 RX ORDER — MAGNESIUM SULFATE HEPTAHYDRATE 40 MG/ML
4 INJECTION, SOLUTION INTRAVENOUS ONCE
Status: COMPLETED | OUTPATIENT
Start: 2024-01-11 | End: 2024-01-11

## 2024-01-11 RX ORDER — OXYCODONE HYDROCHLORIDE 5 MG/1
5 TABLET ORAL EVERY 4 HOURS PRN
Qty: 6 TABLET | Refills: 0 | Status: SHIPPED | OUTPATIENT
Start: 2024-01-11 | End: 2024-02-27

## 2024-01-11 RX ORDER — SCOLOPAMINE TRANSDERMAL SYSTEM 1 MG/1
1 PATCH, EXTENDED RELEASE TRANSDERMAL ONCE
Status: DISCONTINUED | OUTPATIENT
Start: 2024-01-11 | End: 2024-01-11

## 2024-01-11 RX ADMIN — LIDOCAINE HYDROCHLORIDE 3 ML: 20 INJECTION, SOLUTION INFILTRATION; PERINEURAL at 08:21

## 2024-01-11 RX ADMIN — ACETAMINOPHEN 975 MG: 325 TABLET ORAL at 07:38

## 2024-01-11 RX ADMIN — SODIUM CHLORIDE, SODIUM LACTATE, POTASSIUM CHLORIDE, CALCIUM CHLORIDE: 600; 310; 30; 20 INJECTION, SOLUTION INTRAVENOUS at 07:48

## 2024-01-11 RX ADMIN — FENTANYL CITRATE 50 MCG: 50 INJECTION, SOLUTION INTRAMUSCULAR; INTRAVENOUS at 08:21

## 2024-01-11 RX ADMIN — MAGNESIUM SULFATE HEPTAHYDRATE 4 G: 40 INJECTION, SOLUTION INTRAVENOUS at 07:49

## 2024-01-11 RX ADMIN — DEXMEDETOMIDINE HYDROCHLORIDE 4 MCG: 4 INJECTION, SOLUTION INTRAVENOUS at 08:55

## 2024-01-11 RX ADMIN — FENTANYL CITRATE 50 MCG: 50 INJECTION, SOLUTION INTRAMUSCULAR; INTRAVENOUS at 08:33

## 2024-01-11 RX ADMIN — PROPOFOL 200 MCG/KG/MIN: 10 INJECTION, EMULSION INTRAVENOUS at 08:21

## 2024-01-11 RX ADMIN — Medication 30 MG: at 08:21

## 2024-01-11 RX ADMIN — KETOROLAC TROMETHAMINE 15 MG: 15 INJECTION, SOLUTION INTRAMUSCULAR; INTRAVENOUS at 08:57

## 2024-01-11 RX ADMIN — DEXMEDETOMIDINE HYDROCHLORIDE 8 MCG: 4 INJECTION, SOLUTION INTRAVENOUS at 08:39

## 2024-01-11 RX ADMIN — OXYCODONE HYDROCHLORIDE 5 MG: 5 TABLET ORAL at 09:53

## 2024-01-11 RX ADMIN — PROPOFOL 150 MG: 10 INJECTION, EMULSION INTRAVENOUS at 08:21

## 2024-01-11 RX ADMIN — DEXAMETHASONE SODIUM PHOSPHATE 10 MG: 4 INJECTION, SOLUTION INTRA-ARTICULAR; INTRALESIONAL; INTRAMUSCULAR; INTRAVENOUS; SOFT TISSUE at 08:23

## 2024-01-11 RX ADMIN — ONDANSETRON 4 MG: 2 INJECTION INTRAMUSCULAR; INTRAVENOUS at 08:55

## 2024-01-11 NOTE — ANESTHESIA CARE TRANSFER NOTE
Patient: Edel Castro    Procedure: Procedure(s):  LAPAROSCOPIC BILATERAL SALPINGECTOMY, INTRAUTERINE DEVICE REMOVAL,       Diagnosis: Unwanted fertility [Z30.09]  Malpositioned intrauterine device [T83.32XA]  Diagnosis Additional Information: No value filed.    Anesthesia Type:   General     Note:    Oropharynx: oropharynx clear of all foreign objects and spontaneously breathing  Level of Consciousness: awake and drowsy  Oxygen Supplementation: face mask  Level of Supplemental Oxygen (L/min / FiO2): 10  Independent Airway: airway patency satisfactory and stable  Dentition: dentition unchanged  Vital Signs Stable: post-procedure vital signs reviewed and stable  Report to RN Given: handoff report given  Patient transferred to: PACU    Handoff Report: Identifed the Patient, Identified the Reponsible Provider, Reviewed the pertinent medical history, Discussed the surgical course, Reviewed Intra-OP anesthesia mangement and issues during anesthesia, Set expectations for post-procedure period and Allowed opportunity for questions and acknowledgement of understanding      Vitals:  Vitals Value Taken Time   /88 01/11/24 0909   Temp 97.2  F (36.2  C) 01/11/24 0909   Pulse 79 01/11/24 0908   Resp 16 01/11/24 0909   SpO2 99 % 01/11/24 0909   Vitals shown include unfiled device data.    Electronically Signed By: NATALY Cali CRNA  January 11, 2024  9:12 AM

## 2024-01-11 NOTE — ANESTHESIA PROCEDURE NOTES
Airway       Patient location during procedure: OR       Procedure Start/Stop Times: 1/11/2024 8:24 AM  Staff -        Anesthesiologist:  Hernandez Torres MD       CRNA: Ann Robles APRN CRNA       Performed By: CRNA  Consent for Airway        Urgency: elective  Indications and Patient Condition       Indications for airway management: martinez-procedural       Induction type:intravenous       Mask difficulty assessment: 1 - vent by mask    Final Airway Details       Final airway type: endotracheal airway       Successful airway: ETT - single and Oral  Endotracheal Airway Details        ETT size (mm): 7.0       Cuffed: yes       Cuff volume (mL): 7       Successful intubation technique: direct laryngoscopy       DL Blade Type: Ferrara 2       Grade View of Cords: 1       Adjucts: stylet       Position: Right       Measured from: gums/teeth       Secured at (cm): 21       Bite block used: None    Post intubation assessment        Placement verified by: capnometry, equal breath sounds and chest rise        Number of attempts at approach: 1       Number of other approaches attempted: 0       Secured with: tape       Ease of procedure: easy       Dentition: Intact and Unchanged       Dental guard used and removed. Dental Guard Type: Proguard Red.    Medication(s) Administered   Medication Administration Time: 1/11/2024 8:24 AM

## 2024-01-11 NOTE — DISCHARGE INSTRUCTIONS
You have received 975 mg of Acetaminophen (Tylenol) at 7:38AM. Please do not take an additional dose of Tylenol until after 1:38PM.     Do not exceed 4,000 mg of acetaminophen during a 24 hour period and keep in mind that acetaminophen can also be found in many over-the-counter cold medications as well as narcotics that may be given for pain.     You received a medication called Toradol (a stronger IV ibuprofen) at 8:57 AM. Do NOT take any Ibuprofen / Advil / Motrin / Aleve / Naproxen or products containing Ibuprofen until 2:57 PM or later.    You received Oxycodone 5mg at 9:53 AM    Same-Day Surgery   Adult Discharge Orders & Instructions     For 24 hours after surgery    Get plenty of rest.  A responsible adult must stay with you for at least 24 hours after you leave the hospital.     Do not drive or use heavy equipment.  If you have weakness or tingling, don't drive or use heavy equipment until this feeling goes away.    Do not drink alcohol.    Avoid strenuous or risky activities.  Ask for help when climbing stairs.     You may feel lightheaded.  If so, sit for a few minutes before standing.  Have someone help you get up.     You may have a slight fever. Call the doctor if your fever is over 100 F (37.7 C) (taken under the tongue) or lasts longer than 24 hours.    You may have a dry mouth, a sore throat, muscle aches or trouble sleeping.  These should go away after 24 hours.    Do not make important or legal decisions.    Based on the surgery/procedure that you had today, we do not expect that you will have any problems.  However, we want you to know what to do if you have pain, nausea, bleeding, or infection:    To control pain:  Take medicines your physician has prescribed or or over-the counter medicine he or she advises.  Ice packs and periods of rest are often helpful.  For surgery on an arm or leg, raise it on a pillow to ease swelling.  If your pain is not managed with the above methods, contact your  physician.        To control nausea: .  Drink clear liquids such as apple juice, ginger ale, broth or 7-Up. Be sure to drink enough fluids.  Move to a regular diet as you feel able.  Rest may also help.    Bleeding:  You may see a little blood on your dressing, about the size of a quarter in the first 24 hours.  If you see this, there is no reason to be alarmed.  However, if this continues to increase in size, apply pressure if able, and notify your physician.        Infection: Please contact your physician if you have any of the following signs:  redness, swelling, heat, increasing pain or foul-smelling drainage at your surgery site, fever or chills.    Call your doctor for any of the followin.  It has been over 8 to 10 hours since surgery and you are still not able to urinate (pass water).    2.  Headache for over 24 hours.    3.  Numbness, tingling or weakness in your legs the day after surgery (if you had spinal anesthesia).

## 2024-01-11 NOTE — ANESTHESIA PREPROCEDURE EVALUATION
Anesthesia Pre-Procedure Evaluation    Patient: Edel Castro   MRN: [id number] : 1985        Procedure : Procedure(s):  LAPAROSCOPIC BILATERAL SALPINGECTOMY, INTRAUTERINE DEVICE REMOVAL, POSSIBLE MYOMECTOMY          Past Medical History:   Diagnosis Date    Depressive disorder     anxiety    Diabetes (H)     gestational    Hypertension     history     Unsatisfactory cervical Papanicolaou smear 2022      Past Surgical History:   Procedure Laterality Date    GASTRIC BYPASS  2014    sleeve      No Known Allergies   Social History     Tobacco Use    Smoking status: Never    Smokeless tobacco: Never   Substance Use Topics    Alcohol use: Never      Wt Readings from Last 1 Encounters:   24 61.2 kg (135 lb)        Anesthesia Evaluation            ROS/MED HX  ENT/Pulmonary:  - neg pulmonary ROS     Neurologic:  - neg neurologic ROS     Cardiovascular:     (+)  hypertension- -   -  - -                                   (-) murmur   METS/Exercise Tolerance:     Hematologic:  - neg hematologic  ROS     Musculoskeletal:  - neg musculoskeletal ROS     GI/Hepatic:  - neg GI/hepatic ROS     Renal/Genitourinary:  - neg Renal ROS     Endo: Comment: Last mounjaro dose >7 days ago      Psychiatric/Substance Use:     (+) psychiatric history depression and anxiety       Infectious Disease:  - neg infectious disease ROS     Malignancy:  - neg malignancy ROS     Other:            Physical Exam    Airway  airway exam normal      Mallampati: II   TM distance: > 3 FB   Neck ROM: full   Mouth opening: > 3 cm    Respiratory Devices and Support         Dental       (+) Completely normal teeth      Cardiovascular   cardiovascular exam normal       Rhythm and rate: regular and normal (-) no murmur    Pulmonary   pulmonary exam normal        breath sounds clear to auscultation           OUTSIDE LABS:  CBC:   Lab Results   Component Value Date    WBC 16.2 (H) 2021    WBC 13.4 (H) 2021    HGB 11.7  "02/17/2022    HGB 11.9 09/30/2021    HCT 30.8 (L) 08/13/2021    HCT 33.6 (L) 08/12/2021     08/13/2021     08/12/2021     BMP:   Lab Results   Component Value Date     02/20/2023     02/17/2022    POTASSIUM 4.2 02/20/2023    POTASSIUM 3.9 02/17/2022    CHLORIDE 105 02/20/2023    CHLORIDE 108 (H) 02/17/2022    CO2 20 (L) 02/20/2023    CO2 21 (L) 02/17/2022    BUN 8.6 02/20/2023    BUN 14 02/17/2022    CR 0.89 02/20/2023    CR 1.32 (H) 02/17/2022    GLC 99 02/20/2023     02/17/2022     COAGS: No results found for: \"PTT\", \"INR\", \"FIBR\"  POC:   Lab Results   Component Value Date    HCG Negative 01/11/2024     HEPATIC:   Lab Results   Component Value Date    ALBUMIN 4.3 02/20/2023    PROTTOTAL 7.5 02/20/2023    ALT 6 (L) 02/20/2023    AST 13 02/20/2023    ALKPHOS 73 02/20/2023    BILITOTAL 0.6 02/20/2023     OTHER:   Lab Results   Component Value Date    A1C 5.6 02/20/2023    EDWIN 9.5 02/20/2023    TSH 1.35 08/04/2023       Anesthesia Plan    ASA Status:  2    NPO Status:  NPO Appropriate    Anesthesia Type: General.     - Airway: ETT   Induction: Intravenous.   Maintenance: TIVA.        Consents    Anesthesia Plan(s) and associated risks, benefits, and realistic alternatives discussed. Questions answered and patient/representative(s) expressed understanding.     - Discussed: Risks, Benefits and Alternatives for BOTH SEDATION and the PROCEDURE were discussed     - Discussed with:  Patient            Postoperative Care    Pain management: IV analgesics, Oral pain medications.   PONV prophylaxis: Ondansetron (or other 5HT-3), Dexamethasone or Solumedrol     Comments:               Hernandez Torres MD    I have reviewed the pertinent notes and labs in the chart from the past 30 days and (re)examined the patient.  Any updates or changes from those notes are reflected in this note.              # Overweight: Estimated body mass index is 25.3 kg/m  as calculated from the following:    " "Height as of this encounter: 1.556 m (5' 1.25\").    Weight as of this encounter: 61.2 kg (135 lb).      "

## 2024-01-11 NOTE — ANESTHESIA POSTPROCEDURE EVALUATION
Patient: Edel Castro    Procedure: Procedure(s):  LAPAROSCOPIC BILATERAL SALPINGECTOMY, INTRAUTERINE DEVICE REMOVAL,       Anesthesia Type:  General    Note:  Disposition: Outpatient   Postop Pain Control: Uneventful            Sign Out: Well controlled pain   PONV: No   Neuro/Psych: Uneventful            Sign Out: Acceptable/Baseline neuro status   Airway/Respiratory: Uneventful            Sign Out: Acceptable/Baseline resp. status   CV/Hemodynamics: Uneventful            Sign Out: Acceptable CV status; No obvious hypovolemia; No obvious fluid overload   Other NRE: NONE   DID A NON-ROUTINE EVENT OCCUR? No           Last vitals:  Vitals Value Taken Time   /82 01/11/24 0922   Temp 97.2  F (36.2  C) 01/11/24 0909   Pulse 71 01/11/24 0924   Resp 16 01/11/24 0915   SpO2 96 % 01/11/24 0924   Vitals shown include unfiled device data.    Electronically Signed By: Hernandez Torres MD  January 11, 2024  11:27 AM

## 2024-01-11 NOTE — OP NOTE
[facility]  Operative Note    Pre-operative diagnosis: Unwanted fertility [Z30.09]  Malpositioned intrauterine device [T83.32XA]   Post-operative diagnosis * No post-op diagnosis entered *   Procedure: Procedure(s):  LAPAROSCOPIC BILATERAL SALPINGECTOMY, INTRAUTERINE DEVICE REMOVAL,   Surgeon: Monique Feliciano DO   Assistants(s): ZULY Rust   Anesthesia: General    Estimated blood loss: 5cc    Total IV fluids: (See anesthesia record)   Blood transfusion: No transfusion was given during surgery   Total urine output: (See anesthesia record)  400ml   Drains: None   Specimens: Bilateral fallopian tubes   Implants: None     Findings:   Normal appearing pelvic anatomy, IUD not imbedded in myometrium .   Complications: None.   Condition: Stable               Description of procedure:  She was met preoperatively, where we discussed the procedure and the risks associated with the procedure. She understood these to be, but not limited to injury to adjacent organs including bladder, bowel, ureter, infection and bleeding. Patient signed consent and was brought back to the operating room in stable condition.      Patient underwent induction of a general anesthetic, she was carefully prepped and draped for the procedure. Timeout was performed. Bladder was drained with a Reyes catheter, sponge stick was placed into the vagina.     Attention was turned to the abdomen. An incision above the umbilicus. A Veress needle was introduced with a 2 pop technique, saline drop test confirmed adequate placement. Opening pressure was 3-4. Insufflation was now done until 15mm of pressure were established. A 5 nonbladed trocar was placed above the umbilicus. Two more port sights were place in the right and left lower quadrants under direct visualization. Trandelenburg assistance was then used.      The procedure began with identifying the anatomy. Normal appearing uterus, bilateral fallopian tubes and ovaries were were noted. Appendix  wnl, and no gross abnormalities noted within the abdomen. The left fallopian tube was grasped and elevated out of the pelvis. The ligasure was utilized to come across the mesosalpinx, coagulated and transect in a sequential fashion, down to the level just distal to the uterine cornua. The fallopian tube was then removed through the port under direct visualization. The same procedure was then carried out on the right side to remove the right fallopian tube. There was a small amount of oozing near ovary of left mesosalpinx, this was cauterized and surgicel powder placed with good hemostasis. All other transection sites were inspected and noted to be hemostatic.      The trocars were then removed and the gas was removed from the abdomen. Skin was closed with monocryl suture. Steri-Strips applied. Attention was turned to the vagina, Reyes catheter was removed. Sterile speculum was placed. IUD string visualized and were grasped with ring forceps. Gentle downward traction allowed for the removal of the paragard IUD in its entirety. Instruments were removed from vagina. No active bleeding was noted. Sponge and needle counts were correct X 2. She was taken to recovery in stable condition.         Monique Felciiano, DO  [facility]  [phone]

## 2024-01-11 NOTE — H&P
History and Physical GYN    NAME:Edel Castro  : 1985  MRN: [id number]    [facility]    Admission Date: Unwanted fertility [Z30.09]  Malpositioned intrauterine device [T83.32XA]    PCP:  Michelle Starr       CHIEF COMPLAINT: I am done having children    HPI:  Edel Castro is a 38 year old  female who presents for scheduled laparoscopic bilateral salpingectomy with removal of IUD. She was previously counseled of contraceptive options in the office and had decided that she has satisfied parity and desired permanent sterilization. Also IUD was noted to be malpositioned on preoperative US with potential to be imbedded into the myometrium so she opted for surgical removal. No specific concerns today.     PMH:  Past Medical History:   Diagnosis Date    Depressive disorder     anxiety    Diabetes (H)     gestational    Hypertension     history     Unsatisfactory cervical Papanicolaou smear 2022       PSH:  Past Surgical History:   Procedure Laterality Date    GASTRIC BYPASS  2014    sleeve       Social History:  Social History     Socioeconomic History    Marital status:      Spouse name: Not on file    Number of children: 1    Years of education: Not on file    Highest education level: Not on file   Occupational History    Not on file   Tobacco Use    Smoking status: Never    Smokeless tobacco: Never   Vaping Use    Vaping Use: Never used   Substance and Sexual Activity    Alcohol use: Never    Drug use: Never    Sexual activity: Yes     Partners: Male   Other Topics Concern    Not on file   Social History Narrative    Not on file     Social Determinants of Health     Financial Resource Strain: Low Risk  (2024)    Financial Resource Strain     Within the past 12 months, have you or your family members you live with been unable to get utilities (heat, electricity) when it was really needed?: No   Food Insecurity: Low Risk  (2024)    Food Insecurity     Within  "the past 12 months, did you worry that your food would run out before you got money to buy more?: No     Within the past 12 months, did the food you bought just not last and you didn t have money to get more?: No   Transportation Needs: Low Risk  (1/4/2024)    Transportation Needs     Within the past 12 months, has lack of transportation kept you from medical appointments, getting your medicines, non-medical meetings or appointments, work, or from getting things that you need?: No   Physical Activity: Not on file   Stress: Not on file   Social Connections: Not on file   Interpersonal Safety: Not on file   Housing Stability: High Risk (1/4/2024)    Housing Stability     Do you have housing? : No     Are you worried about losing your housing?: No       Medications:  Current Outpatient Medications   Medication    cholecalciferol (VITAMIN D3) 125 mcg (5000 units) capsule    sertraline (ZOLOFT) 100 MG tablet    acetaminophen (TYLENOL) 325 MG tablet    tirzepatide (MOUNJARO) 15 MG/0.5ML pen     Current Facility-Administered Medications   Medication    acetaminophen (TYLENOL) tablet 975 mg    fentaNYL (PF) (SUBLIMAZE) injection 50 mcg    lactated ringers BOLUS 250 mL    lactated ringers infusion    lidocaine (LMX4) kit    lidocaine 1 % 0.1-1 mL    magnesium sulfate 4 g in 100 mL sterile water intermittent infusion    sodium chloride (PF) 0.9% PF flush 3 mL    sodium chloride (PF) 0.9% PF flush 3 mL       Allergies:  No Known Allergies    Review of Systems   Negative except what is stated in the HPI    Physical exam:  /89   Pulse 74   Temp 97.8  F (36.6  C) (Temporal)   Resp 16   Ht 1.556 m (5' 1.25\")   Wt 61.2 kg (135 lb)   SpO2 100%   BMI 25.30 kg/m       General Appearance: Alert, appropriate appearance for age. No acute distress,   HEENT Exam: Grossly normal.  Chest/Respiratory Exam: Normal chest wall and respirations. Clear to auscultation.  Cardiovascular Exam: Regular rate and rhythm. S1, S2, no murmur, " click, gallop, or rubs.,   Gastrointestinal Exam: soft, non-tender; bowel sounds normal; no masses,  no organomegaly  Pelvic Exam Female:  deferred,   Musculoskeletal Exam:full ROM of upper and lower extremities.,   Skin: no rash or abnormalities,   Neurologic Exam: Normal speech, no tremor  Psychiatric Exam: Alert and oriented, appropriate affect.      Pertinent Labs    ASSESSMENT/PLAN:  -Plan to proceed to OR as scheduled for laparoscopic bilateral salpingectomy with removal of IUD. Risks and benefits reviewed. Aftercare reviewed. Patient stated understanding and desire to proceed, consents signed.     Monique Feliciano,

## 2024-01-25 ENCOUNTER — TRANSFERRED RECORDS (OUTPATIENT)
Dept: HEALTH INFORMATION MANAGEMENT | Facility: CLINIC | Age: 39
End: 2024-01-25
Payer: COMMERCIAL

## 2024-02-14 DIAGNOSIS — F41.9 ANXIETY: ICD-10-CM

## 2024-02-14 DIAGNOSIS — E11.9 TYPE 2 DIABETES MELLITUS WITHOUT COMPLICATION, WITHOUT LONG-TERM CURRENT USE OF INSULIN (H): ICD-10-CM

## 2024-02-14 DIAGNOSIS — F33.0 MILD EPISODE OF RECURRENT MAJOR DEPRESSIVE DISORDER (H): ICD-10-CM

## 2024-02-14 DIAGNOSIS — E66.01 MORBID OBESITY (H): ICD-10-CM

## 2024-02-15 RX ORDER — SERTRALINE HYDROCHLORIDE 100 MG/1
150 TABLET, FILM COATED ORAL DAILY
Qty: 135 TABLET | Refills: 0 | Status: SHIPPED | OUTPATIENT
Start: 2024-02-15 | End: 2024-02-27

## 2024-02-15 RX ORDER — TIRZEPATIDE 15 MG/.5ML
INJECTION, SOLUTION SUBCUTANEOUS
Qty: 2 ML | Refills: 0 | Status: SHIPPED | OUTPATIENT
Start: 2024-02-15 | End: 2024-02-27

## 2024-02-16 NOTE — TELEPHONE ENCOUNTER
Below [employer] message was sent to Pt.    Bryan Starr sent refills for your    sertraline (ZOLOFT) 100 MG tablet and  MOUNJARO 15 MG/0.5ML pen      Were sent to your Pharmacy    [id number] [address]   [place] [address]     Phone # [phone]    Fax # [phone]     She also said that you are due for a Physical or Med check for any further refills. Please call [phone] if you need help scheduling a appointment.    Thanks    Arianna Dyer MA

## 2024-02-25 ENCOUNTER — HEALTH MAINTENANCE LETTER (OUTPATIENT)
Age: 39
End: 2024-02-25

## 2024-02-26 SDOH — HEALTH STABILITY: PHYSICAL HEALTH: ON AVERAGE, HOW MANY DAYS PER WEEK DO YOU ENGAGE IN MODERATE TO STRENUOUS EXERCISE (LIKE A BRISK WALK)?: 4 DAYS

## 2024-02-26 SDOH — HEALTH STABILITY: PHYSICAL HEALTH: ON AVERAGE, HOW MANY MINUTES DO YOU ENGAGE IN EXERCISE AT THIS LEVEL?: 30 MIN

## 2024-02-26 ASSESSMENT — SOCIAL DETERMINANTS OF HEALTH (SDOH): HOW OFTEN DO YOU GET TOGETHER WITH FRIENDS OR RELATIVES?: ONCE A WEEK

## 2024-02-27 ENCOUNTER — OFFICE VISIT (OUTPATIENT)
Dept: FAMILY MEDICINE | Facility: CLINIC | Age: 39
End: 2024-02-27
Payer: COMMERCIAL

## 2024-02-27 VITALS
WEIGHT: 137.8 LBS | TEMPERATURE: 98.2 F | OXYGEN SATURATION: 99 % | DIASTOLIC BLOOD PRESSURE: 86 MMHG | HEIGHT: 62 IN | SYSTOLIC BLOOD PRESSURE: 134 MMHG | HEART RATE: 67 BPM | RESPIRATION RATE: 21 BRPM | BODY MASS INDEX: 25.36 KG/M2

## 2024-02-27 DIAGNOSIS — Z13.220 LIPID SCREENING: ICD-10-CM

## 2024-02-27 DIAGNOSIS — F41.9 ANXIETY: ICD-10-CM

## 2024-02-27 DIAGNOSIS — E11.9 TYPE 2 DIABETES MELLITUS WITHOUT COMPLICATION, WITHOUT LONG-TERM CURRENT USE OF INSULIN (H): ICD-10-CM

## 2024-02-27 DIAGNOSIS — Z00.00 ENCOUNTER FOR ROUTINE HISTORY AND PHYSICAL EXAM IN FEMALE: Primary | ICD-10-CM

## 2024-02-27 DIAGNOSIS — F33.0 MILD EPISODE OF RECURRENT MAJOR DEPRESSIVE DISORDER (H): ICD-10-CM

## 2024-02-27 DIAGNOSIS — Z79.899 MEDICATION MANAGEMENT: ICD-10-CM

## 2024-02-27 LAB
CREAT UR-MCNC: 202 MG/DL
HBA1C MFR BLD: 5.5 % (ref 0–5.6)
MICROALBUMIN UR-MCNC: 15.8 MG/L
MICROALBUMIN/CREAT UR: 7.82 MG/G CR (ref 0–25)

## 2024-02-27 PROCEDURE — 82043 UR ALBUMIN QUANTITATIVE: CPT | Performed by: NURSE PRACTITIONER

## 2024-02-27 PROCEDURE — 82570 ASSAY OF URINE CREATININE: CPT | Performed by: NURSE PRACTITIONER

## 2024-02-27 PROCEDURE — 99395 PREV VISIT EST AGE 18-39: CPT | Performed by: NURSE PRACTITIONER

## 2024-02-27 PROCEDURE — 80053 COMPREHEN METABOLIC PANEL: CPT | Performed by: NURSE PRACTITIONER

## 2024-02-27 PROCEDURE — 36415 COLL VENOUS BLD VENIPUNCTURE: CPT | Performed by: NURSE PRACTITIONER

## 2024-02-27 PROCEDURE — 80061 LIPID PANEL: CPT | Performed by: NURSE PRACTITIONER

## 2024-02-27 PROCEDURE — 83036 HEMOGLOBIN GLYCOSYLATED A1C: CPT | Performed by: NURSE PRACTITIONER

## 2024-02-27 PROCEDURE — 99214 OFFICE O/P EST MOD 30 MIN: CPT | Mod: 25 | Performed by: NURSE PRACTITIONER

## 2024-02-27 RX ORDER — SERTRALINE HYDROCHLORIDE 100 MG/1
150 TABLET, FILM COATED ORAL DAILY
Qty: 135 TABLET | Refills: 0 | Status: SHIPPED | OUTPATIENT
Start: 2024-02-27

## 2024-02-27 RX ORDER — TIRZEPATIDE 15 MG/.5ML
INJECTION, SOLUTION SUBCUTANEOUS
Qty: 2 ML | Refills: 0 | Status: SHIPPED | OUTPATIENT
Start: 2024-02-27 | End: 2024-06-11

## 2024-02-27 ASSESSMENT — PAIN SCALES - GENERAL: PAINLEVEL: NO PAIN (0)

## 2024-02-27 NOTE — PROGRESS NOTES
Assessment and Plan:    Encounter for routine history and physical exam in female  Recommend consuming a healthy diet and exercising.  She declines pneumococcal, COVID, influenza vaccines today.    Lipid screening  - Lipid panel reflex to direct LDL Non-fasting  - Lipid panel reflex to direct LDL Non-fasting    Type 2 diabetes mellitus without complication, without long-term current use of insulin (H)  Will check A1c today.  This has been controlled.  She continues Mounjaro.  - HEMOGLOBIN A1C  - Albumin Random Urine Quantitative with Creat Ratio  - HEMOGLOBIN A1C  - Albumin Random Urine Quantitative with Creat Ratio  - tirzepatide (MOUNJARO) 15 MG/0.5ML pen  Dispense: 2 mL; Refill: 0    Anxiety  Mild episode of recurrent major depressive disorder (H24)  Patient has an appointment approaching with psychiatry.  Symptoms are controlled.  She is concerned of night sweats.  She continues sertraline.  - sertraline (ZOLOFT) 100 MG tablet  Dispense: 135 tablet; Refill: 0    Medication management  - Comprehensive metabolic panel (BMP + Alb, Alk Phos, ALT, AST, Total. Bili, TP)  - Comprehensive metabolic panel (BMP + Alb, Alk Phos, ALT, AST, Total. Bili, TP)      Subjective:     Edel is a 38 year old female presenting to the clinic for a female physical.     LMP: Sunday, regular once/month   Hx of abnormal pap smear: none   Last pap smear: 4/5/22 normal, negative HPV   Perform self-breast exams: occasionally   Vaginal discharge or irritation: none   Sexually active: yes,  for 5 years   Contraception: tubal   Concerns for STDs: none   Previous pregnancies:two pregnancies (vaginal deliveries)   Kids are 2 1/2 and 6 (both girls)     Patient has type 2 diabetes and is injecting Mounjaro 15 mg once weekly.  She is tolerating the medication well.  She occasionally has nausea 1 to 2 days after the injection.  She only checks her blood sugars if she is feeling hypoglycemic.  She is consuming a healthy diet.  She exercises by  walking the dogs daily.  She performs light strength training.  She denies any sores on her feet.  She is up-to-date on her eye exam.  Patient is taking sertraline 150 mg daily for anxiety and depression.  She plans on seeing psychiatry as she has been experiencing night sweats which she believes is a side effect to the sertraline.     Review of systems:  I performed a 10 point review of systems.  All pertinent positives and negatives are noted in the HPI. All others are negative.     No Known Allergies    Current Outpatient Medications   Medication    acetaminophen (TYLENOL) 325 MG tablet    cholecalciferol (VITAMIN D3) 125 mcg (5000 units) capsule    ibuprofen (ADVIL/MOTRIN) 800 MG tablet    MOUNJARO 15 MG/0.5ML pen    sertraline (ZOLOFT) 100 MG tablet    oxyCODONE (ROXICODONE) 5 MG tablet     No current facility-administered medications for this visit.       Social History     Socioeconomic History    Marital status:      Spouse name: Not on file    Number of children: 1    Years of education: Not on file    Highest education level: Not on file   Occupational History    Not on file   Tobacco Use    Smoking status: Never    Smokeless tobacco: Never   Vaping Use    Vaping Use: Never used   Substance and Sexual Activity    Alcohol use: Never    Drug use: Never    Sexual activity: Yes     Partners: Male   Other Topics Concern    Not on file   Social History Narrative    Not on file     Social Determinants of Health     Financial Resource Strain: Low Risk  (2/26/2024)    Financial Resource Strain     Within the past 12 months, have you or your family members you live with been unable to get utilities (heat, electricity) when it was really needed?: No   Food Insecurity: Low Risk  (2/26/2024)    Food Insecurity     Within the past 12 months, did you worry that your food would run out before you got money to buy more?: No     Within the past 12 months, did the food you bought just not last and you didn t have  money to get more?: No   Transportation Needs: Low Risk  (2/26/2024)    Transportation Needs     Within the past 12 months, has lack of transportation kept you from medical appointments, getting your medicines, non-medical meetings or appointments, work, or from getting things that you need?: No   Physical Activity: Insufficiently Active (2/26/2024)    Exercise Vital Sign     Days of Exercise per Week: 4 days     Minutes of Exercise per Session: 30 min   Stress: Stress Concern Present (2/26/2024)    [employer] - Occupational Stress Questionnaire     Feeling of Stress : To some extent   Social Connections: Unknown (2/26/2024)    Social Connection and Isolation Panel [NHANES]     Frequency of Communication with Friends and Family: Not on file     Frequency of Social Gatherings with Friends and Family: Once a week     Attends Jain Services: Not on file     Active Member of Clubs or Organizations: Not on file     Attends Club or Organization Meetings: Not on file     Marital Status: Not on file   Interpersonal Safety: Low Risk  (2/27/2024)    Interpersonal Safety     Do you feel physically and emotionally safe where you currently live?: Yes     Within the past 12 months, have you been hit, slapped, kicked or otherwise physically hurt by someone?: No     Within the past 12 months, have you been humiliated or emotionally abused in other ways by your partner or ex-partner?: No   Housing Stability: Low Risk  (2/26/2024)    Housing Stability     Do you have housing? : Yes     Are you worried about losing your housing?: No   Recent Concern: Housing Stability - High Risk (1/4/2024)    Housing Stability     Do you have housing? : No     Are you worried about losing your housing?: No       Past Medical History:   Diagnosis Date    Depressive disorder     anxiety    Diabetes (H)     gestational    Hypertension     history     Unsatisfactory cervical Papanicolaou smear 02/17/2022 02/17/22  "      Family History   Problem Relation Age of Onset    Diabetes Mother         pre-diabetic    No Known Problems Father     No Known Problems Brother        Past Surgical History:   Procedure Laterality Date    GASTRIC BYPASS  2014    sleeve    LAPAROSCOPIC SALPINGECTOMY Bilateral 1/11/2024    Procedure: LAPAROSCOPIC BILATERAL SALPINGECTOMY, INTRAUTERINE DEVICE REMOVAL,;  Surgeon: Monique Feliciano DO;  Location: [facility]       Objective:     /86 (BP Location: Right arm, Patient Position: Sitting, Cuff Size: Adult Regular)   Pulse 67   Temp 98.2  F (36.8  C) (Temporal)   Resp 21   Ht 1.575 m (5' 2\")   Wt 62.5 kg (137 lb 12.8 oz)   LMP 02/25/2024 (Exact Date)   SpO2 99%   BMI 25.20 kg/m      Patient is alert, no obvious distress.   Skin: Warm, dry.  No rashes or lesions. Skin turgor rapid return.   HEENT:  Eyes normal.  Ears normal.  Nose patent, mucosa pink.  Oropharynx mucosa pink, no lesions or tonsil enlargement.   Neck:  Supple, without lymphadenopathy, bruits, JVD. Thyroid normal texture and size.    Lungs:  Clear to auscultation.  No wheezing, rales noted.  Respirations even and unlabored.   Heart:  Regular rate and rhythm.  No murmurs.   Breasts:  Normal.  No surrounding adenopathy.   Abdomen: Soft, nontender.  No organomegaly.  Bowel sounds normoactive.  No guarding or masses noted.   :  deferred  Musculoskeletal:  Full ROM of extremities.  Muscle strength equal +5/5.   Neurological:  Cranial nerves 2-12 intact.          Answers submitted by the patient for this visit:  Patient Health Questionnaire (Submitted on 1/4/2024)  If you checked off any problems, how difficult have these problems made it for you to do your work, take care of things at home, or get along with other people?: Not difficult at all  PHQ9 TOTAL SCORE: 4  Annual Preventive Visit (Submitted on 1/4/2024)  Chief Complaint: Annual Exam:  Exercise outside of work (Submitted on 1/4/2024)  Chief Complaint: Annual " Exam:

## 2024-02-28 LAB
ALBUMIN SERPL BCG-MCNC: 4.6 G/DL (ref 3.5–5.2)
ALP SERPL-CCNC: 58 U/L (ref 40–150)
ALT SERPL W P-5'-P-CCNC: 11 U/L (ref 0–50)
ANION GAP SERPL CALCULATED.3IONS-SCNC: 9 MMOL/L (ref 7–15)
AST SERPL W P-5'-P-CCNC: 14 U/L (ref 0–45)
BILIRUB SERPL-MCNC: 0.7 MG/DL
BUN SERPL-MCNC: 8.9 MG/DL (ref 6–20)
CALCIUM SERPL-MCNC: 9.4 MG/DL (ref 8.6–10)
CHLORIDE SERPL-SCNC: 107 MMOL/L (ref 98–107)
CHOLEST SERPL-MCNC: 182 MG/DL
CREAT SERPL-MCNC: 0.79 MG/DL (ref 0.51–0.95)
DEPRECATED HCO3 PLAS-SCNC: 24 MMOL/L (ref 22–29)
EGFRCR SERPLBLD CKD-EPI 2021: >90 ML/MIN/1.73M2
FASTING STATUS PATIENT QL REPORTED: NO
GLUCOSE SERPL-MCNC: 98 MG/DL (ref 70–99)
HDLC SERPL-MCNC: 65 MG/DL
LDLC SERPL CALC-MCNC: 104 MG/DL
NONHDLC SERPL-MCNC: 117 MG/DL
POTASSIUM SERPL-SCNC: 3.8 MMOL/L (ref 3.4–5.3)
PROT SERPL-MCNC: 7.3 G/DL (ref 6.4–8.3)
SODIUM SERPL-SCNC: 140 MMOL/L (ref 135–145)
TRIGL SERPL-MCNC: 67 MG/DL

## 2024-02-29 ENCOUNTER — MYC MEDICAL ADVICE (OUTPATIENT)
Dept: FAMILY MEDICINE | Facility: CLINIC | Age: 39
End: 2024-02-29
Payer: COMMERCIAL

## 2024-02-29 DIAGNOSIS — R11.0 NAUSEA: Primary | ICD-10-CM

## 2024-02-29 RX ORDER — ONDANSETRON 4 MG/1
4 TABLET, ORALLY DISINTEGRATING ORAL EVERY 8 HOURS PRN
Qty: 20 TABLET | Refills: 1 | Status: SHIPPED | OUTPATIENT
Start: 2024-02-29

## 2024-04-10 ENCOUNTER — TRANSFERRED RECORDS (OUTPATIENT)
Dept: HEALTH INFORMATION MANAGEMENT | Facility: CLINIC | Age: 39
End: 2024-04-10
Payer: COMMERCIAL

## 2024-04-10 LAB — RETINOPATHY: NEGATIVE

## 2024-06-10 DIAGNOSIS — E11.9 TYPE 2 DIABETES MELLITUS WITHOUT COMPLICATION, WITHOUT LONG-TERM CURRENT USE OF INSULIN (H): ICD-10-CM

## 2024-06-11 RX ORDER — TIRZEPATIDE 15 MG/.5ML
INJECTION, SOLUTION SUBCUTANEOUS
Qty: 2 ML | Refills: 0 | Status: SHIPPED | OUTPATIENT
Start: 2024-06-11 | End: 2024-08-28

## 2024-06-13 ENCOUNTER — TELEPHONE (OUTPATIENT)
Dept: FAMILY MEDICINE | Facility: CLINIC | Age: 39
End: 2024-06-13
Payer: COMMERCIAL

## 2024-06-13 NOTE — TELEPHONE ENCOUNTER
Medication Question or Refill    Contacts         Type Contact Phone/Fax    06/13/2024 03:25 PM CDT Fax (Incoming) [facility] #[id number] - [facility] [address] AT [facility] (Pharmacy) [phone]            What medication are you calling about (include dose and sig)?: MOUNJARO 15 MG/0.5ML pen     Preferred Pharmacy:  [facility] #[id number]  [facility] [address] AT [facility] [address]  [address]  [place] [address]  Phone: [phone] Fax: [phone]      Controlled Substance Agreement on file:   CSA -- Patient Level:    CSA: None found at the patient level.       Who prescribed the medication?: Starr    Do you need a refill? No    When did you use the medication last? N/A    Patient offered an appointment? No    Do you have any questions or concerns?  Yes: [facility] stated that medication is on back order and asked to send to a different pharmacy or change medication      Could we send this information to you in [facility] or would you prefer to receive a phone call?:   Patient would prefer a phone call   Okay to leave a detailed message?: Yes at Home number on file [phone] (home)

## 2024-06-13 NOTE — TELEPHONE ENCOUNTER
Please attempt to determine which other pharmacy carries Mounjaro 15 mg/0.5 ml pen so that he can be refilled.  Prefer not to have to change medication nor dose if possible.

## 2024-06-14 NOTE — TELEPHONE ENCOUNTER
Sent "[employer]"t message to patient informing her to check with her local pharmacies for Mounjaro.     Jim Allen RN  [facility]

## 2024-07-14 ENCOUNTER — HEALTH MAINTENANCE LETTER (OUTPATIENT)
Age: 39
End: 2024-07-14

## 2024-08-27 DIAGNOSIS — E11.9 TYPE 2 DIABETES MELLITUS WITHOUT COMPLICATION, WITHOUT LONG-TERM CURRENT USE OF INSULIN (H): ICD-10-CM

## 2024-08-28 RX ORDER — TIRZEPATIDE 15 MG/.5ML
INJECTION, SOLUTION SUBCUTANEOUS
Qty: 2 ML | Refills: 0 | Status: SHIPPED | OUTPATIENT
Start: 2024-08-28

## 2024-10-08 ENCOUNTER — E-VISIT (OUTPATIENT)
Dept: URGENT CARE | Facility: CLINIC | Age: 39
End: 2024-10-08
Payer: COMMERCIAL

## 2024-10-08 DIAGNOSIS — H10.32 ACUTE BACTERIAL CONJUNCTIVITIS OF LEFT EYE: Primary | ICD-10-CM

## 2024-10-08 PROCEDURE — 99421 OL DIG E/M SVC 5-10 MIN: CPT | Performed by: PHYSICIAN ASSISTANT

## 2024-10-08 RX ORDER — OFLOXACIN 3 MG/ML
SOLUTION/ DROPS OPHTHALMIC
Qty: 5 ML | Refills: 0 | Status: SHIPPED | OUTPATIENT
Start: 2024-10-08 | End: 2024-10-15

## 2024-10-09 NOTE — PATIENT INSTRUCTIONS
Thank you for choosing us for your care. I have placed an order for a prescription so that you can start treatment. View your full visit summary for details by clicking on the link below. Your pharmacist will able to address any questions you may have about the medication.     If you re not feeling better within 2-3 days, please schedule an appointment.  You can schedule an appointment right here in [facility], or call [phone]  If the visit is for the same symptoms as your eVisit, we ll refund the cost of your eVisit if seen within seven days.    Pinkeye: Care Instructions  Overview     Pinkeye is redness and swelling of the eye surface and the conjunctiva (the lining of the eyelid and the covering of the white part of the eye). Pinkeye is also called conjunctivitis. Pinkeye is often caused by infection with bacteria or a virus. Dry air, allergies, smoke, and chemicals are other common causes.  Pinkeye often gets better on its own in 7 to 10 days. Antibiotics only help if the pinkeye is caused by bacteria. Pinkeye caused by infection spreads easily. If an allergy or chemical is causing pinkeye, it will not go away unless you can avoid whatever is causing it.  Follow-up care is a key part of your treatment and safety. Be sure to make and go to all appointments, and call your doctor if you are having problems. It's also a good idea to know your test results and keep a list of the medicines you take.  How can you care for yourself at home?  Wash your hands often. Always wash them before and after you treat pinkeye or touch your eyes or face.  Use moist cotton or a clean, wet cloth to remove crust. Wipe from the inside corner of the eye to the outside. Use a clean part of the cloth for each wipe.  Put cold or warm wet cloths on your eye a few times a day if the eye hurts.  Do not wear contact lenses or eye makeup until the pinkeye is gone. Throw away any eye makeup you were using when you got pinkeye. Clean your  "contacts and storage case. If you wear disposable contacts, use a new pair when your eye has cleared and it is safe to wear contacts again.  If the doctor gave you antibiotic ointment or eyedrops, use them as directed. Use the medicine for as long as instructed, even if your eye starts looking better soon. Keep the bottle tip clean, and do not let it touch the eye area.  To put in eyedrops or ointment:  Tilt your head back, and pull your lower eyelid down with one finger.  Drop or squirt the medicine inside the lower lid.  Close your eye for 30 to 60 seconds to let the drops or ointment move around.  Do not touch the ointment or dropper tip to your eyelashes or any other surface.  Do not share towels, pillows, or washcloths while you have pinkeye.  When should you call for help?   Call your doctor now or seek immediate medical care if:    You have pain in your eye, not just irritation on the surface.     You have a change in vision or loss of vision.     You have an increase in discharge from the eye.     Your eye has not started to improve or begins to get worse within 48 hours after you start using antibiotics.     Pinkeye lasts longer than 7 days.   Watch closely for changes in your health, and be sure to contact your doctor if you have any problems.  Where can you learn more?  Go to https://www.NumberPicture.net/patiented  Enter Y392 in the search box to learn more about \"Pinkeye: Care Instructions.\"  Current as of: June 5, 2023  Content Version: 14.2 2024 [facility].   Care instructions adapted under license by your healthcare professional. If you have questions about a medical condition or this instruction, always ask your healthcare professional. [employer] Incorporated disclaims any warranty or liability for your use of this information.     Taking Care of Pinkeye at Home (01:30)  Your health professional recommends that you watch this short online health video.  Learn ways to ease the " discomfort of pinkeye and keep the infection from spreading.   Purpose: Apply home treatment for pinkeye.  Goal: Apply home treatment for pinkeye.    Watch: Scan the QR code or visit the link to view video       https://hwi.[email or web address]  Current as of: June 5, 2023  Content Version: 14.2 2024 [facility] [employer].   Care instructions adapted under license by your healthcare professional. If you have questions about a medical condition or this instruction, always ask your healthcare professional. Healthwise[employer] Incorporated disclaims any warranty or liability for your use of this information.

## 2024-11-04 ENCOUNTER — MYC MEDICAL ADVICE (OUTPATIENT)
Dept: FAMILY MEDICINE | Facility: CLINIC | Age: 39
End: 2024-11-04
Payer: COMMERCIAL

## 2024-11-04 NOTE — TELEPHONE ENCOUNTER
Please assist her with scheduling a sooner appointment. I have approval req tomorrow if she can.  Thanks.

## 2024-11-05 RX ORDER — DEXTROAMPHETAMINE SACCHARATE, AMPHETAMINE ASPARTATE MONOHYDRATE, DEXTROAMPHETAMINE SULFATE AND AMPHETAMINE SULFATE 3.75; 3.75; 3.75; 3.75 MG/1; MG/1; MG/1; MG/1
CAPSULE, EXTENDED RELEASE ORAL
COMMUNITY
Start: 2024-10-07

## 2024-11-05 RX ORDER — DEXTROAMPHETAMINE SACCHARATE, AMPHETAMINE ASPARTATE MONOHYDRATE, DEXTROAMPHETAMINE SULFATE AND AMPHETAMINE SULFATE 2.5; 2.5; 2.5; 2.5 MG/1; MG/1; MG/1; MG/1
10 CAPSULE, EXTENDED RELEASE ORAL DAILY
COMMUNITY
Start: 2024-08-13 | End: 2024-11-07

## 2024-11-05 RX ORDER — DEXTROAMPHETAMINE SACCHARATE, AMPHETAMINE ASPARTATE, DEXTROAMPHETAMINE SULFATE AND AMPHETAMINE SULFATE 2.5; 2.5; 2.5; 2.5 MG/1; MG/1; MG/1; MG/1
TABLET ORAL
COMMUNITY
Start: 2024-09-12 | End: 2024-11-07

## 2024-11-05 NOTE — TELEPHONE ENCOUNTER
Writer called patient and assisted her with scheduling a visit with PCP on 11/7/24. Patient states she has had on and off headaches for a few days but they improve with medication.     Writer educated patient that if she develops any concerning symptoms including headaches, vision changes, dizziness, lightheadedness, chest pain, shortness of breath or any other new or concerning symptoms patient should be seen more urgently and patient agrees.    JOSE G Rivera, RN  [facility]

## 2024-11-06 ASSESSMENT — PATIENT HEALTH QUESTIONNAIRE - PHQ9
SUM OF ALL RESPONSES TO PHQ QUESTIONS 1-9: 1
SUM OF ALL RESPONSES TO PHQ QUESTIONS 1-9: 1
10. IF YOU CHECKED OFF ANY PROBLEMS, HOW DIFFICULT HAVE THESE PROBLEMS MADE IT FOR YOU TO DO YOUR WORK, TAKE CARE OF THINGS AT HOME, OR GET ALONG WITH OTHER PEOPLE: NOT DIFFICULT AT ALL

## 2024-11-07 ENCOUNTER — OFFICE VISIT (OUTPATIENT)
Dept: FAMILY MEDICINE | Facility: CLINIC | Age: 39
End: 2024-11-07
Payer: COMMERCIAL

## 2024-11-07 VITALS
HEART RATE: 70 BPM | TEMPERATURE: 98.9 F | BODY MASS INDEX: 25.21 KG/M2 | OXYGEN SATURATION: 100 % | WEIGHT: 137 LBS | HEIGHT: 62 IN | RESPIRATION RATE: 14 BRPM | DIASTOLIC BLOOD PRESSURE: 92 MMHG | SYSTOLIC BLOOD PRESSURE: 150 MMHG

## 2024-11-07 DIAGNOSIS — E11.9 TYPE 2 DIABETES MELLITUS WITHOUT COMPLICATION, WITHOUT LONG-TERM CURRENT USE OF INSULIN (H): ICD-10-CM

## 2024-11-07 DIAGNOSIS — E55.9 VITAMIN D DEFICIENCY: ICD-10-CM

## 2024-11-07 DIAGNOSIS — I10 BENIGN ESSENTIAL HYPERTENSION: Primary | ICD-10-CM

## 2024-11-07 DIAGNOSIS — Z79.899 MEDICATION MANAGEMENT: ICD-10-CM

## 2024-11-07 DIAGNOSIS — F90.9 ATTENTION DEFICIT HYPERACTIVITY DISORDER (ADHD), UNSPECIFIED ADHD TYPE: ICD-10-CM

## 2024-11-07 DIAGNOSIS — E61.1 IRON DEFICIENCY: ICD-10-CM

## 2024-11-07 DIAGNOSIS — I10 BENIGN ESSENTIAL HYPERTENSION: ICD-10-CM

## 2024-11-07 PROBLEM — E66.01 MORBID OBESITY (H): Status: RESOLVED | Noted: 2022-02-17 | Resolved: 2024-11-07

## 2024-11-07 PROBLEM — Z97.5 IUD (INTRAUTERINE DEVICE) IN PLACE: Status: RESOLVED | Noted: 2020-01-06 | Resolved: 2024-11-07

## 2024-11-07 LAB
ATRIAL RATE - MUSE: 67 BPM
DIASTOLIC BLOOD PRESSURE - MUSE: NORMAL MMHG
ERYTHROCYTE [DISTWIDTH] IN BLOOD BY AUTOMATED COUNT: 13.3 % (ref 10–15)
EST. AVERAGE GLUCOSE BLD GHB EST-MCNC: 117 MG/DL
HBA1C MFR BLD: 5.7 % (ref 0–5.6)
HCT VFR BLD AUTO: 34.1 % (ref 35–47)
HGB BLD-MCNC: 11.2 G/DL (ref 11.7–15.7)
INTERPRETATION ECG - MUSE: NORMAL
MCH RBC QN AUTO: 26.8 PG (ref 26.5–33)
MCHC RBC AUTO-ENTMCNC: 32.8 G/DL (ref 31.5–36.5)
MCV RBC AUTO: 82 FL (ref 78–100)
P AXIS - MUSE: 25 DEGREES
PLATELET # BLD AUTO: 297 10E3/UL (ref 150–450)
PR INTERVAL - MUSE: 122 MS
QRS DURATION - MUSE: 90 MS
QT - MUSE: 424 MS
QTC - MUSE: 448 MS
R AXIS - MUSE: 23 DEGREES
RBC # BLD AUTO: 4.18 10E6/UL (ref 3.8–5.2)
SYSTOLIC BLOOD PRESSURE - MUSE: NORMAL MMHG
T AXIS - MUSE: 40 DEGREES
VENTRICULAR RATE- MUSE: 67 BPM
WBC # BLD AUTO: 8.1 10E3/UL (ref 4–11)

## 2024-11-07 PROCEDURE — 80048 BASIC METABOLIC PNL TOTAL CA: CPT | Performed by: NURSE PRACTITIONER

## 2024-11-07 PROCEDURE — 99214 OFFICE O/P EST MOD 30 MIN: CPT | Performed by: NURSE PRACTITIONER

## 2024-11-07 PROCEDURE — 82306 VITAMIN D 25 HYDROXY: CPT | Performed by: NURSE PRACTITIONER

## 2024-11-07 PROCEDURE — 85027 COMPLETE CBC AUTOMATED: CPT | Performed by: NURSE PRACTITIONER

## 2024-11-07 PROCEDURE — G2211 COMPLEX E/M VISIT ADD ON: HCPCS | Performed by: NURSE PRACTITIONER

## 2024-11-07 PROCEDURE — 83540 ASSAY OF IRON: CPT | Performed by: NURSE PRACTITIONER

## 2024-11-07 PROCEDURE — 93005 ELECTROCARDIOGRAM TRACING: CPT | Performed by: NURSE PRACTITIONER

## 2024-11-07 PROCEDURE — 82728 ASSAY OF FERRITIN: CPT | Performed by: NURSE PRACTITIONER

## 2024-11-07 PROCEDURE — 83550 IRON BINDING TEST: CPT | Performed by: NURSE PRACTITIONER

## 2024-11-07 PROCEDURE — 93010 ELECTROCARDIOGRAM REPORT: CPT | Performed by: INTERNAL MEDICINE

## 2024-11-07 PROCEDURE — 36415 COLL VENOUS BLD VENIPUNCTURE: CPT | Performed by: NURSE PRACTITIONER

## 2024-11-07 PROCEDURE — 83036 HEMOGLOBIN GLYCOSYLATED A1C: CPT | Performed by: NURSE PRACTITIONER

## 2024-11-07 PROCEDURE — 96127 BRIEF EMOTIONAL/BEHAV ASSMT: CPT | Performed by: NURSE PRACTITIONER

## 2024-11-07 RX ORDER — TIRZEPATIDE 15 MG/.5ML
INJECTION, SOLUTION SUBCUTANEOUS
Status: CANCELLED | OUTPATIENT
Start: 2024-11-07

## 2024-11-07 RX ORDER — TIRZEPATIDE 15 MG/.5ML
INJECTION, SOLUTION SUBCUTANEOUS
Qty: 2 ML | Refills: 2 | Status: SHIPPED | OUTPATIENT
Start: 2024-11-07

## 2024-11-07 RX ORDER — LOSARTAN POTASSIUM 25 MG/1
25 TABLET ORAL DAILY
Qty: 90 TABLET | OUTPATIENT
Start: 2024-11-07

## 2024-11-07 RX ORDER — LOSARTAN POTASSIUM 25 MG/1
25 TABLET ORAL DAILY
Qty: 30 TABLET | Refills: 0 | Status: SHIPPED | OUTPATIENT
Start: 2024-11-07

## 2024-11-07 ASSESSMENT — PAIN SCALES - GENERAL: PAINLEVEL_OUTOF10: NO PAIN (0)

## 2024-11-07 NOTE — PROGRESS NOTES
Assessment and Plan:     Benign essential hypertension  Patient's blood pressure is elevated today.  She is symptomatic.  We discussed that Adderall and caffeine use is likely contributing.  She does not want to discontinue her Adderall.  Will start losartan 25 mg daily.  Educated on its indications and side effects.  Patient will follow-up in 2 weeks for blood pressure and BMP recheck.  - losartan (COZAAR) 25 MG tablet  Dispense: 30 tablet; Refill: 0  - EKG 12-lead, tracing only    Attention deficit hyperactivity disorder (ADHD), unspecified ADHD type  Patient will speak to her psychiatrist regarding Adderall use and new onset hypertension.    Type 2 diabetes mellitus without complication, without long-term current use of insulin (H)  Will check A1c.  This is controlled.  She continues Mounjaro.  - Hemoglobin A1c  - Tirzepatide (MOUNJARO) 15 MG/0.5ML SOAJ  Dispense: 2 mL; Refill: 2    Vitamin D deficiency  She is taking the supplement, but is unsure of the dose.  Will check vitamin D.  - Vitamin D Deficiency    Iron deficiency  Will rule out iron deficiency.  She is taking daily multivitamin.  - Ferritin  - CBC with platelets  - Iron and iron binding capacity    Medication management  - Basic metabolic panel  (Ca, Cl, CO2, Creat, Gluc, K, Na, BUN)        Subjective:     Edel is a 39 year old female presenting to the clinic for concerns for hypertension.  Patient has been diagnosed with ADHD and is seeing a psychiatrist.  She is currently taking Adderall XR 15 mg daily.  She has noticed an increase in headaches.  They are occurring twice per week.  She has a history of preeclampsia.  Patient took her blood pressure recently and it was around 146/90.  She denies blurry vision, chest pain, shortness of breath with exertion, edema, orthopnea, syncope.  She does drink 2 diet Cokes daily.  Patient is using Mounjaro for diabetes and weight loss.  Last A1c was 5.5% on 2/27/2024.  Patient requests vitamin D level.  She has  a history of vitamin D deficiency.  She is unsure of the dose of the supplement she is taking.  She also has a history of iron deficiency anemia.  She is taking a daily multivitamin.    Reviewof Systems: A complete 14 point review of systems was obtained and is negative or as stated in the history of present illness.    Social History     Socioeconomic History    Marital status:      Spouse name: Not on file    Number of children: 1    Years of education: Not on file    Highest education level: Not on file   Occupational History    Not on file   Tobacco Use    Smoking status: Never     Passive exposure: Never    Smokeless tobacco: Never   Vaping Use    Vaping status: Never Used   Substance and Sexual Activity    Alcohol use: Never    Drug use: Never    Sexual activity: Yes     Partners: Male   Other Topics Concern    Not on file   Social History Narrative    Not on file     Social Drivers of Health     Financial Resource Strain: Low Risk  (2/26/2024)    Financial Resource Strain     Within the past 12 months, have you or your family members you live with been unable to get utilities (heat, electricity) when it was really needed?: No   Food Insecurity: Low Risk  (2/26/2024)    Food Insecurity     Within the past 12 months, did you worry that your food would run out before you got money to buy more?: No     Within the past 12 months, did the food you bought just not last and you didn t have money to get more?: No   Transportation Needs: Low Risk  (2/26/2024)    Transportation Needs     Within the past 12 months, has lack of transportation kept you from medical appointments, getting your medicines, non-medical meetings or appointments, work, or from getting things that you need?: No   Physical Activity: Insufficiently Active (2/26/2024)    Exercise Vital Sign     Days of Exercise per Week: 4 days     Minutes of Exercise per Session: 30 min   Stress: Stress Concern Present (2/26/2024)    [employer] [place] [employer]  Occupational Health - Occupational Stress Questionnaire     Feeling of Stress : To some extent   Social Connections: Unknown (2/26/2024)    Social Connection and Isolation Panel [NHANES]     Frequency of Communication with Friends and Family: Not on file     Frequency of Social Gatherings with Friends and Family: Once a week     Attends [employer] Services: Not on file     Active Member of Clubs or Organizations: Not on file     Attends Club or Organization Meetings: Not on file     Marital Status: Not on file   Interpersonal Safety: Low Risk  (11/7/2024)    Interpersonal Safety     Do you feel physically and emotionally safe where you currently live?: Yes     Within the past 12 months, have you been hit, slapped, kicked or otherwise physically hurt by someone?: No     Within the past 12 months, have you been humiliated or emotionally abused in other ways by your partner or ex-partner?: No   Housing Stability: Low Risk  (2/26/2024)    Housing Stability     Do you have housing? : Yes     Are you worried about losing your housing?: No   Recent Concern: Housing Stability - High Risk (1/4/2024)    Housing Stability     Do you have housing? : No     Are you worried about losing your housing?: No       Active Ambulatory Problems     Diagnosis Date Noted    Acne 01/06/2020    Anxiety 01/06/2020    Cervical cancer screening 10/13/2016    Polycystic ovary syndrome 01/06/2020    Severe pre-eclampsia in third trimester 05/25/2017    Status post bariatric surgery 04/29/2014    Surveillance for birth control, intrauterine device 08/04/2017    Mild episode of recurrent major depressive disorder (H) 01/06/2020    Essential hypertension 01/06/2020    H/O gastric bypass 01/06/2020    Vitamin deficiency 01/06/2020    Rosacea 01/06/2020    Insomnia, unspecified type 05/14/2020    Gestational diabetes 08/12/2021    Chronic hypertension with superimposed pre-eclampsia 08/13/2021    Normal delivery 08/13/2021    Obesity 09/30/2021    Hx  "of preeclampsia, prior pregnancy, currently pregnant, second trimester 09/30/2021    AMA (advanced maternal age) multigravida 35+, second trimester 09/30/2021    Insulin resistance 10/03/2021    Lactation problem 10/03/2021    Morbid obesity (H) 02/17/2022     Resolved Ambulatory Problems     Diagnosis Date Noted    IUD (intrauterine device) in place 01/06/2020    Unsatisfactory cervical Papanicolaou smear 02/17/2022     Past Medical History:   Diagnosis Date    Depressive disorder     Diabetes (H)     Hypertension        Family History   Problem Relation Age of Onset    Diabetes Mother         pre-diabetic    No Known Problems Father     No Known Problems Brother        Objective:     BP (!) 150/92   Pulse 70   Temp 98.9  F (37.2  C)   Resp 14   Ht 1.575 m (5' 2\")   Wt 62.1 kg (137 lb)   LMP 10/28/2024   SpO2 100%   Breastfeeding No   BMI 25.06 kg/m      Patient is alert, in no obvious distress.   Skin: Warm, dry.    Neck: Supple, no lymphadenopathy. No thyromegaly.  Lungs:  Clear to auscultation. Respirations even and unlabored.  No wheezing or rales noted.   Heart:  Regular rate and rhythm.  No murmurs, S3, S4, gallops, or rubs.    Musculoskeletal:  No edema is present in bilateral lower extremities.     LABORATORY: I ordered and personally reviewed an EKG showing normal sinus rhythm.                Answers submitted by the patient for this visit:  Patient Health Questionnaire (Submitted on 11/6/2024)  If you checked off any problems, how difficult have these problems made it for you to do your work, take care of things at home, or get along with other people?: Not difficult at all  PHQ9 TOTAL SCORE: 1  Hypertension Visit (Submitted on 11/6/2024)  Chief Complaint: Chronic problems general questions HPI Form  Do you check your blood pressure regularly outside of the clinic?: No  Are your blood pressures ever more than 140 on the top number (systolic) OR more than 90 on the bottom number (diastolic)? (For " example, greater than 140/90): Yes  Are you following a low salt diet?: Yes  Diabetes Visit (Submitted on 11/6/2024)  Chief Complaint: Chronic problems general questions HPI Form  Frequency of checking blood sugars:: not at all  Diabetic concerns:: none  Paraesthesia present:: none of these symptoms  General Questionnaire (Submitted on 11/6/2024)  Chief Complaint: Chronic problems general questions HPI Form  How many servings of fruits and vegetables do you eat daily?: 2-3  On average, how many sweetened beverages do you drink each day (Examples: soda, juice, sweet tea, etc.  Do NOT count diet or artificially sweetened beverages)?: 0  How many minutes a day do you exercise enough to make your heart beat faster?: 30 to 60  How many days a week do you exercise enough to make your heart beat faster?: 5  How many days per week do you miss taking your medication?: 0  Questionnaire about: Chronic problems general questions HPI Form (Submitted on 11/6/2024)  Chief Complaint: Chronic problems general questions HPI Form

## 2024-11-08 LAB
ANION GAP SERPL CALCULATED.3IONS-SCNC: 13 MMOL/L (ref 7–15)
BUN SERPL-MCNC: 6.4 MG/DL (ref 6–20)
CALCIUM SERPL-MCNC: 9.4 MG/DL (ref 8.8–10.4)
CHLORIDE SERPL-SCNC: 104 MMOL/L (ref 98–107)
CREAT SERPL-MCNC: 0.72 MG/DL (ref 0.51–0.95)
EGFRCR SERPLBLD CKD-EPI 2021: >90 ML/MIN/1.73M2
FERRITIN SERPL-MCNC: 16 NG/ML (ref 6–175)
GLUCOSE SERPL-MCNC: 100 MG/DL (ref 70–99)
HCO3 SERPL-SCNC: 23 MMOL/L (ref 22–29)
IRON BINDING CAPACITY (ROCHE): 378 UG/DL (ref 240–430)
IRON SATN MFR SERPL: 7 % (ref 15–46)
IRON SERPL-MCNC: 27 UG/DL (ref 37–145)
POTASSIUM SERPL-SCNC: 3.7 MMOL/L (ref 3.4–5.3)
SODIUM SERPL-SCNC: 140 MMOL/L (ref 135–145)
VIT D+METAB SERPL-MCNC: 67 NG/ML (ref 20–50)

## 2024-11-13 ENCOUNTER — E-VISIT (OUTPATIENT)
Dept: URGENT CARE | Facility: CLINIC | Age: 39
End: 2024-11-13
Payer: COMMERCIAL

## 2024-11-13 DIAGNOSIS — N39.0 ACUTE UTI (URINARY TRACT INFECTION): Primary | ICD-10-CM

## 2024-11-13 RX ORDER — NITROFURANTOIN 25; 75 MG/1; MG/1
100 CAPSULE ORAL 2 TIMES DAILY
Qty: 10 CAPSULE | Refills: 0 | Status: SHIPPED | OUTPATIENT
Start: 2024-11-13 | End: 2024-11-18

## 2024-11-13 NOTE — PATIENT INSTRUCTIONS
Dear Edel Castro    After reviewing your responses, I've been able to diagnose you with a urinary tract infection, which is a common infection of the bladder with bacteria.  This is not a sexually transmitted infection, though urinating immediately after intercourse can help prevent infections.  Drinking lots of fluids is also helpful to clear your current infection and prevent the next one.      I have sent a prescription for antibiotics to your pharmacy to treat this infection.    It is important that you take all of your prescribed medication even if your symptoms are improving after a few doses.  Taking all of your medicine helps prevent the symptoms from returning.     If your symptoms worsen, you develop pain in your back or stomach, develop fevers, or are not improving in 5 days, please contact your primary care provider for an appointment or visit any of our convenient Walk-in or Urgent Care Centers to be seen, which can be found on our website here.    Thanks again for choosing us as your health care partner,    NATALY Avila CNP  Urinary Tract Infection (UTI) in Women: Care Instructions  Overview     A urinary tract infection (UTI) is an infection caused by bacteria. It can happen anywhere in the urinary tract. A UTI can happen in the:  Kidneys.  Ureters, the tubes that connect the kidneys to the bladder.  Bladder.  Urethra, where the urine comes out.  Most UTIs are bladder infections. They often cause pain or burning when you urinate.  Most UTIs can be cured with antibiotics. If you are prescribed antibiotics, be sure to complete your treatment so that the infection does not get worse.  Follow-up care is a key part of your treatment and safety. Be sure to make and go to all appointments, and call your doctor if you are having problems. It's also a good idea to know your test results and keep a list of the medicines you take.  How can you care for yourself at home?  Take your antibiotics as  "directed. Do not stop taking them just because you feel better. You need to take the full course of antibiotics.  Drink extra water and other fluids for the next day or two. This will help make the urine less concentrated and help wash out the bacteria that are causing the infection. (If you have kidney, heart, or liver disease and have to limit fluids, talk with your doctor before you increase the amount of fluids you drink.)  Avoid drinks that are carbonated or have caffeine. They can irritate the bladder.  Urinate often. Try to empty your bladder each time.  To relieve pain, take a hot bath or lay a heating pad set on low over your lower belly or genital area. Never go to sleep with a heating pad in place.  To prevent UTIs  Drink plenty of water each day. This helps you urinate often, which clears bacteria from your system. (If you have kidney, heart, or liver disease and have to limit fluids, talk with your doctor before you increase the amount of fluids you drink.)  Urinate when you need to.  If you are sexually active, urinate right after you have sex.  Change sanitary pads often.  Avoid douches, bubble baths, feminine hygiene sprays, and other feminine hygiene products that have deodorants.  After going to the bathroom, wipe from front to back.  When should you call for help?   Call your doctor now or seek immediate medical care if:    You have new or worse fever, chills, nausea, or vomiting.     You have new pain in your back just below your rib cage. This is called flank pain.     There is new blood or pus in your urine.     You have any problems with your antibiotic medicine.   Watch closely for changes in your health, and be sure to contact your doctor if:    You are not getting better after taking an antibiotic for 2 days.     Your symptoms go away but then come back.   Where can you learn more?  Go to https://www.healthwise.net/patiented  Enter K848 in the search box to learn more about \"Urinary Tract " "Infection (UTI) in Women: Care Instructions.\"  Current as of: November 15, 2023  Content Version: 14.2 2024 [facility] [employer], [facility].   Care instructions adapted under license by your healthcare professional. If you have questions about a medical condition or this instruction, always ask your healthcare professional. Healthwise, Incorporated disclaims any warranty or liability for your use of this information.    "

## 2024-11-25 ENCOUNTER — ALLIED HEALTH/NURSE VISIT (OUTPATIENT)
Dept: FAMILY MEDICINE | Facility: CLINIC | Age: 39
End: 2024-11-25
Payer: COMMERCIAL

## 2024-11-25 VITALS — DIASTOLIC BLOOD PRESSURE: 78 MMHG | SYSTOLIC BLOOD PRESSURE: 118 MMHG

## 2024-11-25 DIAGNOSIS — Z01.30 BP CHECK: Primary | ICD-10-CM

## 2024-11-25 PROCEDURE — 99207 PR NO CHARGE NURSE ONLY: CPT

## 2024-11-25 NOTE — PROGRESS NOTES
I met with Edel Castro at the request of Michelle Starr to recheck her blood pressure.  Blood pressure medications on the med list were reviewed with patient.    Patient has taken all medications as per usual regimen: Yes  Patient reports tolerating them without any issues or concerns: Yes    Vitals:    11/25/24 0912   BP: 118/78   BP Location: Left arm   Patient Position: Sitting   Cuff Size: Adult Regular       Blood pressure was taken, previous encounter was reviewed, recorded blood pressure below 140/90.  Patient was discharged and the note will be sent to the provider for final review.    Angelika PEREIRA CMA on November 25, 2024 at 9:14 AM

## 2024-11-30 ENCOUNTER — LAB (OUTPATIENT)
Dept: LAB | Facility: CLINIC | Age: 39
End: 2024-11-30
Payer: COMMERCIAL

## 2024-11-30 ENCOUNTER — E-VISIT (OUTPATIENT)
Dept: URGENT CARE | Facility: CLINIC | Age: 39
End: 2024-11-30
Payer: COMMERCIAL

## 2024-11-30 DIAGNOSIS — N39.0 ACUTE UTI: Primary | ICD-10-CM

## 2024-11-30 DIAGNOSIS — R30.0 DIFFICULT OR PAINFUL URINATION: ICD-10-CM

## 2024-11-30 DIAGNOSIS — R30.0 DIFFICULT OR PAINFUL URINATION: Primary | ICD-10-CM

## 2024-11-30 LAB
BACTERIA #/AREA URNS HPF: ABNORMAL /HPF
CLUE CELLS: ABNORMAL
RBC #/AREA URNS AUTO: ABNORMAL /HPF
SQUAMOUS #/AREA URNS AUTO: ABNORMAL /LPF
TRICHOMONAS, WET PREP: ABNORMAL
WBC #/AREA URNS AUTO: ABNORMAL /HPF
WBC'S/HIGH POWER FIELD, WET PREP: ABNORMAL
YEAST, WET PREP: ABNORMAL

## 2024-11-30 PROCEDURE — 81015 MICROSCOPIC EXAM OF URINE: CPT

## 2024-11-30 PROCEDURE — 87210 SMEAR WET MOUNT SALINE/INK: CPT

## 2024-11-30 PROCEDURE — 87086 URINE CULTURE/COLONY COUNT: CPT

## 2024-11-30 RX ORDER — CEPHALEXIN 500 MG/1
500 CAPSULE ORAL 3 TIMES DAILY
Qty: 21 CAPSULE | Refills: 0 | Status: SHIPPED | OUTPATIENT
Start: 2024-11-30 | End: 2024-12-07

## 2024-11-30 NOTE — PATIENT INSTRUCTIONS
Dear Edel Castro,     After reviewing your responses, I would like you to come in for a urine test to make sure we treat you correctly. This urine test is to evaluate you for a possible urinary tract infection, and should be scheduled for today or tomorrow. Schedule a Lab Only appointment here.     Lab appointments are not available at most locations on the weekends. If no Lab Only appointment is available, you should be seen in any of our convenient Walk-in or Urgent Care Centers, which can be found on our website here.     You will receive instructions with your results in [employer] once they are available.     If your symptoms worsen, you develop pain in your back or stomach, develop fevers, or are not improving in 5 days, please contact your primary care provider for an appointment or visit a Walk-in or Urgent Care Center to be seen.     Thanks again for choosing us as your health care partner,     Diane Paz, CNP

## 2024-12-01 LAB — BACTERIA UR CULT: NORMAL

## 2024-12-24 DIAGNOSIS — I10 BENIGN ESSENTIAL HYPERTENSION: ICD-10-CM

## 2024-12-24 RX ORDER — LOSARTAN POTASSIUM 25 MG/1
25 TABLET ORAL DAILY
Qty: 30 TABLET | Refills: 0 | Status: SHIPPED | OUTPATIENT
Start: 2024-12-24

## 2025-01-28 ENCOUNTER — PATIENT OUTREACH (OUTPATIENT)
Dept: CARE COORDINATION | Facility: CLINIC | Age: 40
End: 2025-01-28
Payer: COMMERCIAL

## 2025-02-11 ENCOUNTER — PATIENT OUTREACH (OUTPATIENT)
Dept: CARE COORDINATION | Facility: CLINIC | Age: 40
End: 2025-02-11
Payer: COMMERCIAL

## 2025-02-15 ENCOUNTER — HEALTH MAINTENANCE LETTER (OUTPATIENT)
Age: 40
End: 2025-02-15

## 2025-03-18 ENCOUNTER — MYC REFILL (OUTPATIENT)
Dept: FAMILY MEDICINE | Facility: CLINIC | Age: 40
End: 2025-03-18
Payer: COMMERCIAL

## 2025-03-18 DIAGNOSIS — E11.9 TYPE 2 DIABETES MELLITUS WITHOUT COMPLICATION, WITHOUT LONG-TERM CURRENT USE OF INSULIN (H): Primary | ICD-10-CM

## 2025-03-18 DIAGNOSIS — R11.0 NAUSEA: ICD-10-CM

## 2025-03-18 DIAGNOSIS — I10 BENIGN ESSENTIAL HYPERTENSION: ICD-10-CM

## 2025-03-18 RX ORDER — LOSARTAN POTASSIUM 25 MG/1
25 TABLET ORAL DAILY
Qty: 30 TABLET | Refills: 1 | Status: SHIPPED | OUTPATIENT
Start: 2025-03-18

## 2025-03-18 RX ORDER — ONDANSETRON 4 MG/1
4 TABLET, ORALLY DISINTEGRATING ORAL EVERY 8 HOURS PRN
Qty: 20 TABLET | Refills: 1 | Status: SHIPPED | OUTPATIENT
Start: 2025-03-18

## 2025-04-05 ENCOUNTER — HEALTH MAINTENANCE LETTER (OUTPATIENT)
Age: 40
End: 2025-04-05

## 2025-04-07 ENCOUNTER — TRANSFERRED RECORDS (OUTPATIENT)
Dept: HEALTH INFORMATION MANAGEMENT | Facility: CLINIC | Age: 40
End: 2025-04-07
Payer: COMMERCIAL

## 2025-05-17 ENCOUNTER — HEALTH MAINTENANCE LETTER (OUTPATIENT)
Age: 40
End: 2025-05-17

## 2025-06-05 ENCOUNTER — MYC MEDICAL ADVICE (OUTPATIENT)
Dept: FAMILY MEDICINE | Facility: CLINIC | Age: 40
End: 2025-06-05
Payer: COMMERCIAL

## 2025-06-05 NOTE — TELEPHONE ENCOUNTER
Patient Quality Outreach    Patient is due for the following:   Diabetes -  Diabetic Follow-Up Visit  Physical Preventive Adult Physical    Action(s) Taken:   Schedule a Adult Preventative    Type of outreach:    Sent [employer] message.    Questions for provider review:    None         Jim Allen RN

## 2025-08-30 ENCOUNTER — HEALTH MAINTENANCE LETTER (OUTPATIENT)
Age: 40
End: 2025-08-30